# Patient Record
Sex: FEMALE | Race: WHITE | Employment: OTHER | URBAN - METROPOLITAN AREA
[De-identification: names, ages, dates, MRNs, and addresses within clinical notes are randomized per-mention and may not be internally consistent; named-entity substitution may affect disease eponyms.]

---

## 2017-06-14 DIAGNOSIS — R73.9 HYPERGLYCEMIA: ICD-10-CM

## 2017-06-14 DIAGNOSIS — E78.00 HYPERCHOLESTEROLEMIA: ICD-10-CM

## 2017-06-14 DIAGNOSIS — I10 ESSENTIAL HYPERTENSION: ICD-10-CM

## 2017-06-14 LAB
A/G RATIO: 1.7 (ref 1.1–2.2)
ALBUMIN SERPL-MCNC: 4.1 G/DL (ref 3.4–5)
ALP BLD-CCNC: 79 U/L (ref 40–129)
ALT SERPL-CCNC: 14 U/L (ref 10–40)
ANION GAP SERPL CALCULATED.3IONS-SCNC: 16 MMOL/L (ref 3–16)
AST SERPL-CCNC: 13 U/L (ref 15–37)
BILIRUB SERPL-MCNC: 0.4 MG/DL (ref 0–1)
BUN BLDV-MCNC: 16 MG/DL (ref 7–20)
CALCIUM SERPL-MCNC: 9.1 MG/DL (ref 8.3–10.6)
CHLORIDE BLD-SCNC: 104 MMOL/L (ref 99–110)
CHOLESTEROL, TOTAL: 170 MG/DL (ref 0–199)
CO2: 24 MMOL/L (ref 21–32)
CREAT SERPL-MCNC: 0.8 MG/DL (ref 0.6–1.2)
GFR AFRICAN AMERICAN: >60
GFR NON-AFRICAN AMERICAN: >60
GLOBULIN: 2.4 G/DL
GLUCOSE BLD-MCNC: 120 MG/DL (ref 70–99)
HDLC SERPL-MCNC: 42 MG/DL (ref 40–60)
LDL CHOLESTEROL CALCULATED: 108 MG/DL
POTASSIUM SERPL-SCNC: 4.6 MMOL/L (ref 3.5–5.1)
SODIUM BLD-SCNC: 144 MMOL/L (ref 136–145)
TOTAL PROTEIN: 6.5 G/DL (ref 6.4–8.2)
TRIGL SERPL-MCNC: 99 MG/DL (ref 0–150)
VLDLC SERPL CALC-MCNC: 20 MG/DL

## 2017-06-15 ENCOUNTER — OFFICE VISIT (OUTPATIENT)
Dept: INTERNAL MEDICINE CLINIC | Age: 77
End: 2017-06-15

## 2017-06-15 VITALS
TEMPERATURE: 98.2 F | DIASTOLIC BLOOD PRESSURE: 76 MMHG | WEIGHT: 161 LBS | SYSTOLIC BLOOD PRESSURE: 128 MMHG | HEIGHT: 64 IN | OXYGEN SATURATION: 97 % | HEART RATE: 62 BPM | BODY MASS INDEX: 27.49 KG/M2

## 2017-06-15 DIAGNOSIS — I10 ESSENTIAL HYPERTENSION: Primary | ICD-10-CM

## 2017-06-15 DIAGNOSIS — Z72.0 TOBACCO ABUSE: ICD-10-CM

## 2017-06-15 DIAGNOSIS — E78.00 HYPERCHOLESTEROLEMIA: ICD-10-CM

## 2017-06-15 DIAGNOSIS — R55 VASOVAGAL SYNCOPE: ICD-10-CM

## 2017-06-15 DIAGNOSIS — R73.9 HYPERGLYCEMIA: ICD-10-CM

## 2017-06-15 LAB
ESTIMATED AVERAGE GLUCOSE: 131.2 MG/DL
HBA1C MFR BLD: 6.2 %

## 2017-06-15 PROCEDURE — 99214 OFFICE O/P EST MOD 30 MIN: CPT | Performed by: INTERNAL MEDICINE

## 2017-06-15 ASSESSMENT — ENCOUNTER SYMPTOMS
COUGH: 1
EYES NEGATIVE: 1

## 2017-08-02 RX ORDER — ATENOLOL 50 MG/1
TABLET ORAL
Qty: 30 TABLET | Refills: 0 | Status: SHIPPED | OUTPATIENT
Start: 2017-08-02 | End: 2017-08-08

## 2017-08-02 RX ORDER — ATORVASTATIN CALCIUM 80 MG/1
TABLET, FILM COATED ORAL
Qty: 30 TABLET | Refills: 0 | Status: SHIPPED | OUTPATIENT
Start: 2017-08-02 | End: 2017-09-07 | Stop reason: SDUPTHER

## 2017-08-08 ENCOUNTER — TELEPHONE (OUTPATIENT)
Dept: INTERNAL MEDICINE CLINIC | Age: 77
End: 2017-08-08

## 2017-08-08 DIAGNOSIS — I10 ESSENTIAL HYPERTENSION: Primary | ICD-10-CM

## 2017-08-08 RX ORDER — CARVEDILOL 6.25 MG/1
6.25 TABLET ORAL 2 TIMES DAILY
Qty: 60 TABLET | Refills: 2 | Status: SHIPPED | OUTPATIENT
Start: 2017-08-08 | End: 2017-12-15 | Stop reason: SDUPTHER

## 2017-09-07 ENCOUNTER — TELEPHONE (OUTPATIENT)
Dept: INTERNAL MEDICINE CLINIC | Age: 77
End: 2017-09-07

## 2017-09-07 DIAGNOSIS — I10 ESSENTIAL HYPERTENSION: ICD-10-CM

## 2017-09-07 RX ORDER — CARVEDILOL 6.25 MG/1
6.25 TABLET ORAL 2 TIMES DAILY WITH MEALS
Qty: 60 TABLET | Refills: 5 | Status: SHIPPED | OUTPATIENT
Start: 2017-09-07 | End: 2017-12-15 | Stop reason: CLARIF

## 2017-09-07 RX ORDER — CARVEDILOL 6.25 MG/1
6.25 TABLET ORAL 2 TIMES DAILY WITH MEALS
COMMUNITY
End: 2017-09-07 | Stop reason: SDUPTHER

## 2017-12-13 DIAGNOSIS — R73.9 HYPERGLYCEMIA: ICD-10-CM

## 2017-12-13 DIAGNOSIS — E78.00 HYPERCHOLESTEROLEMIA: ICD-10-CM

## 2017-12-13 DIAGNOSIS — I10 ESSENTIAL HYPERTENSION: ICD-10-CM

## 2017-12-13 LAB
A/G RATIO: 1.7 (ref 1.1–2.2)
ALBUMIN SERPL-MCNC: 4.3 G/DL (ref 3.4–5)
ALP BLD-CCNC: 81 U/L (ref 40–129)
ALT SERPL-CCNC: 16 U/L (ref 10–40)
ANION GAP SERPL CALCULATED.3IONS-SCNC: 15 MMOL/L (ref 3–16)
AST SERPL-CCNC: 15 U/L (ref 15–37)
BILIRUB SERPL-MCNC: 0.6 MG/DL (ref 0–1)
BUN BLDV-MCNC: 15 MG/DL (ref 7–20)
CALCIUM SERPL-MCNC: 9.6 MG/DL (ref 8.3–10.6)
CHLORIDE BLD-SCNC: 102 MMOL/L (ref 99–110)
CHOLESTEROL, TOTAL: 202 MG/DL (ref 0–199)
CO2: 26 MMOL/L (ref 21–32)
CREAT SERPL-MCNC: 0.7 MG/DL (ref 0.6–1.2)
GFR AFRICAN AMERICAN: >60
GFR NON-AFRICAN AMERICAN: >60
GLOBULIN: 2.6 G/DL
GLUCOSE BLD-MCNC: 107 MG/DL (ref 70–99)
POTASSIUM SERPL-SCNC: 4.8 MMOL/L (ref 3.5–5.1)
SODIUM BLD-SCNC: 143 MMOL/L (ref 136–145)
TOTAL PROTEIN: 6.9 G/DL (ref 6.4–8.2)

## 2017-12-14 ENCOUNTER — TELEPHONE (OUTPATIENT)
Dept: INTERNAL MEDICINE CLINIC | Age: 77
End: 2017-12-14

## 2017-12-14 LAB
ESTIMATED AVERAGE GLUCOSE: 122.6 MG/DL
HBA1C MFR BLD: 5.9 %

## 2017-12-15 ENCOUNTER — OFFICE VISIT (OUTPATIENT)
Dept: INTERNAL MEDICINE CLINIC | Age: 77
End: 2017-12-15

## 2017-12-15 VITALS
WEIGHT: 160 LBS | HEART RATE: 70 BPM | SYSTOLIC BLOOD PRESSURE: 160 MMHG | OXYGEN SATURATION: 98 % | BODY MASS INDEX: 27.31 KG/M2 | HEIGHT: 64 IN | DIASTOLIC BLOOD PRESSURE: 86 MMHG

## 2017-12-15 DIAGNOSIS — I10 ESSENTIAL HYPERTENSION: Primary | ICD-10-CM

## 2017-12-15 DIAGNOSIS — Z72.0 TOBACCO ABUSE: ICD-10-CM

## 2017-12-15 DIAGNOSIS — E78.00 HYPERCHOLESTEROLEMIA: ICD-10-CM

## 2017-12-15 DIAGNOSIS — M25.552 PAIN OF LEFT HIP JOINT: ICD-10-CM

## 2017-12-15 DIAGNOSIS — R73.9 HYPERGLYCEMIA: ICD-10-CM

## 2017-12-15 PROCEDURE — 99214 OFFICE O/P EST MOD 30 MIN: CPT | Performed by: INTERNAL MEDICINE

## 2017-12-15 RX ORDER — ATENOLOL 50 MG/1
50 TABLET ORAL DAILY
Qty: 30 TABLET | Refills: 5 | Status: SHIPPED | OUTPATIENT
Start: 2017-12-15 | End: 2018-05-13 | Stop reason: SDUPTHER

## 2017-12-15 RX ORDER — ATENOLOL 50 MG/1
50 TABLET ORAL DAILY
COMMUNITY
End: 2017-12-15 | Stop reason: SDUPTHER

## 2017-12-15 ASSESSMENT — ENCOUNTER SYMPTOMS: RESPIRATORY NEGATIVE: 1

## 2018-01-02 ENCOUNTER — OFFICE VISIT (OUTPATIENT)
Dept: ORTHOPEDIC SURGERY | Age: 78
End: 2018-01-02

## 2018-01-02 VITALS
HEIGHT: 64 IN | TEMPERATURE: 98.7 F | WEIGHT: 160 LBS | HEART RATE: 57 BPM | RESPIRATION RATE: 15 BRPM | BODY MASS INDEX: 27.31 KG/M2 | SYSTOLIC BLOOD PRESSURE: 149 MMHG | DIASTOLIC BLOOD PRESSURE: 65 MMHG

## 2018-01-02 DIAGNOSIS — M16.12 PRIMARY OSTEOARTHRITIS OF LEFT HIP: Primary | ICD-10-CM

## 2018-01-02 PROCEDURE — 99203 OFFICE O/P NEW LOW 30 MIN: CPT | Performed by: ORTHOPAEDIC SURGERY

## 2018-01-02 NOTE — PATIENT INSTRUCTIONS
Impression:   1. Moderate to severe osteoarthritis of her left hip joint rated to overuse. 2. History of polio since 5years of age affecting right lower extremity. 3. Aspirin allergy. 4. Smoking history. Plan/Treatment:   1. Treatment options were discussed with José Antonio Matthews. Since she is limited on medications I have recommended an ultrasound directed interarticular steroid injection. 2. She is scheduled to return tomorrow to our [place] office for this reason.     Axel Hendrickson MD  1/2/2018

## 2018-01-03 ENCOUNTER — OFFICE VISIT (OUTPATIENT)
Dept: ORTHOPEDIC SURGERY | Age: 78
End: 2018-01-03

## 2018-01-03 VITALS
HEIGHT: 64 IN | WEIGHT: 160 LBS | HEART RATE: 56 BPM | SYSTOLIC BLOOD PRESSURE: 147 MMHG | BODY MASS INDEX: 27.31 KG/M2 | DIASTOLIC BLOOD PRESSURE: 70 MMHG

## 2018-01-03 DIAGNOSIS — M16.12 PRIMARY OSTEOARTHRITIS OF LEFT HIP: Primary | ICD-10-CM

## 2018-01-03 PROCEDURE — 99999 PR OFFICE/OUTPT VISIT,PROCEDURE ONLY: CPT | Performed by: ORTHOPAEDIC SURGERY

## 2018-01-03 PROCEDURE — 20611 DRAIN/INJ JOINT/BURSA W/US: CPT | Performed by: ORTHOPAEDIC SURGERY

## 2018-05-07 RX ORDER — ATORVASTATIN CALCIUM 80 MG/1
TABLET, FILM COATED ORAL
Qty: 90 TABLET | Refills: 2 | Status: SHIPPED | OUTPATIENT
Start: 2018-05-07 | End: 2019-02-02 | Stop reason: SDUPTHER

## 2018-05-13 DIAGNOSIS — I10 ESSENTIAL HYPERTENSION: ICD-10-CM

## 2018-05-13 RX ORDER — ATENOLOL 50 MG/1
50 TABLET ORAL DAILY
Qty: 90 TABLET | Refills: 1 | Status: SHIPPED | OUTPATIENT
Start: 2018-05-13 | End: 2018-11-05 | Stop reason: SDUPTHER

## 2018-06-08 DIAGNOSIS — R73.9 HYPERGLYCEMIA: ICD-10-CM

## 2018-06-08 DIAGNOSIS — E78.00 HYPERCHOLESTEROLEMIA: ICD-10-CM

## 2018-06-08 DIAGNOSIS — I10 ESSENTIAL HYPERTENSION: ICD-10-CM

## 2018-06-08 LAB
A/G RATIO: 1.7 (ref 1.1–2.2)
ALBUMIN SERPL-MCNC: 4.3 G/DL (ref 3.4–5)
ALP BLD-CCNC: 84 U/L (ref 40–129)
ALT SERPL-CCNC: 15 U/L (ref 10–40)
ANION GAP SERPL CALCULATED.3IONS-SCNC: 15 MMOL/L (ref 3–16)
AST SERPL-CCNC: 14 U/L (ref 15–37)
BILIRUB SERPL-MCNC: 0.6 MG/DL (ref 0–1)
BUN BLDV-MCNC: 19 MG/DL (ref 7–20)
CALCIUM SERPL-MCNC: 9.3 MG/DL (ref 8.3–10.6)
CHLORIDE BLD-SCNC: 104 MMOL/L (ref 99–110)
CHOLESTEROL, TOTAL: 195 MG/DL (ref 0–199)
CO2: 25 MMOL/L (ref 21–32)
CREAT SERPL-MCNC: 0.8 MG/DL (ref 0.6–1.2)
GFR AFRICAN AMERICAN: >60
GFR NON-AFRICAN AMERICAN: >60
GLOBULIN: 2.6 G/DL
GLUCOSE BLD-MCNC: 117 MG/DL (ref 70–99)
HDLC SERPL-MCNC: 48 MG/DL (ref 40–60)
LDL CHOLESTEROL CALCULATED: 128 MG/DL
POTASSIUM SERPL-SCNC: 4.7 MMOL/L (ref 3.5–5.1)
SODIUM BLD-SCNC: 144 MMOL/L (ref 136–145)
TOTAL PROTEIN: 6.9 G/DL (ref 6.4–8.2)
TRIGL SERPL-MCNC: 95 MG/DL (ref 0–150)
VLDLC SERPL CALC-MCNC: 19 MG/DL

## 2018-06-09 LAB
ESTIMATED AVERAGE GLUCOSE: 122.6 MG/DL
HBA1C MFR BLD: 5.9 %

## 2018-06-12 ENCOUNTER — OFFICE VISIT (OUTPATIENT)
Dept: INTERNAL MEDICINE CLINIC | Age: 78
End: 2018-06-12

## 2018-06-12 VITALS
BODY MASS INDEX: 27.42 KG/M2 | RESPIRATION RATE: 12 BRPM | DIASTOLIC BLOOD PRESSURE: 72 MMHG | OXYGEN SATURATION: 98 % | HEART RATE: 81 BPM | WEIGHT: 160.6 LBS | HEIGHT: 64 IN | SYSTOLIC BLOOD PRESSURE: 132 MMHG

## 2018-06-12 DIAGNOSIS — I10 ESSENTIAL HYPERTENSION: Primary | ICD-10-CM

## 2018-06-12 DIAGNOSIS — M16.12 PRIMARY OSTEOARTHRITIS OF LEFT HIP: ICD-10-CM

## 2018-06-12 DIAGNOSIS — E78.00 HYPERCHOLESTEROLEMIA: ICD-10-CM

## 2018-06-12 DIAGNOSIS — Z72.0 TOBACCO ABUSE: ICD-10-CM

## 2018-06-12 DIAGNOSIS — R73.9 HYPERGLYCEMIA: ICD-10-CM

## 2018-06-12 PROCEDURE — 99214 OFFICE O/P EST MOD 30 MIN: CPT | Performed by: INTERNAL MEDICINE

## 2018-06-12 ASSESSMENT — ENCOUNTER SYMPTOMS
RESPIRATORY NEGATIVE: 1
EYE REDNESS: 0
CHEST TIGHTNESS: 0
EYES NEGATIVE: 1
BACK PAIN: 1
ABDOMINAL DISTENTION: 0
SINUS PAIN: 0
ABDOMINAL PAIN: 0
SHORTNESS OF BREATH: 0
COUGH: 0
WHEEZING: 0
DIARRHEA: 0
VOMITING: 0
SORE THROAT: 0
GASTROINTESTINAL NEGATIVE: 1

## 2018-11-05 DIAGNOSIS — I10 ESSENTIAL HYPERTENSION: ICD-10-CM

## 2018-11-05 RX ORDER — ATENOLOL 50 MG/1
50 TABLET ORAL DAILY
Qty: 90 TABLET | Refills: 1 | Status: SHIPPED | OUTPATIENT
Start: 2018-11-05 | End: 2019-05-08 | Stop reason: SDUPTHER

## 2018-11-21 DIAGNOSIS — I10 ESSENTIAL HYPERTENSION: ICD-10-CM

## 2018-11-21 RX ORDER — LOSARTAN POTASSIUM 50 MG/1
TABLET ORAL
Qty: 90 TABLET | Refills: 0 | Status: SHIPPED | OUTPATIENT
Start: 2018-11-21 | End: 2019-02-20 | Stop reason: SDUPTHER

## 2018-12-10 DIAGNOSIS — E78.00 HYPERCHOLESTEROLEMIA: ICD-10-CM

## 2018-12-10 DIAGNOSIS — R73.9 HYPERGLYCEMIA: ICD-10-CM

## 2018-12-10 DIAGNOSIS — I10 ESSENTIAL HYPERTENSION: ICD-10-CM

## 2018-12-10 LAB
A/G RATIO: 1.3 (ref 1.1–2.2)
ALBUMIN SERPL-MCNC: 3.9 G/DL (ref 3.4–5)
ALP BLD-CCNC: 76 U/L (ref 40–129)
ALT SERPL-CCNC: 18 U/L (ref 10–40)
ANION GAP SERPL CALCULATED.3IONS-SCNC: 16 MMOL/L (ref 3–16)
AST SERPL-CCNC: 16 U/L (ref 15–37)
BILIRUB SERPL-MCNC: 0.4 MG/DL (ref 0–1)
BUN BLDV-MCNC: 11 MG/DL (ref 7–20)
CALCIUM SERPL-MCNC: 9.2 MG/DL (ref 8.3–10.6)
CHLORIDE BLD-SCNC: 105 MMOL/L (ref 99–110)
CHOLESTEROL, TOTAL: 196 MG/DL (ref 0–199)
CO2: 22 MMOL/L (ref 21–32)
CREAT SERPL-MCNC: 0.7 MG/DL (ref 0.6–1.2)
GFR AFRICAN AMERICAN: >60
GFR NON-AFRICAN AMERICAN: >60
GLOBULIN: 2.9 G/DL
GLUCOSE BLD-MCNC: 113 MG/DL (ref 70–99)
POTASSIUM SERPL-SCNC: 4.3 MMOL/L (ref 3.5–5.1)
SODIUM BLD-SCNC: 143 MMOL/L (ref 136–145)
TOTAL PROTEIN: 6.8 G/DL (ref 6.4–8.2)

## 2018-12-13 LAB
REASON FOR REJECTION: NORMAL
REJECTED TEST: NORMAL

## 2019-02-02 RX ORDER — ATORVASTATIN CALCIUM 80 MG/1
TABLET, FILM COATED ORAL
Qty: 90 TABLET | Refills: 0 | Status: SHIPPED | OUTPATIENT
Start: 2019-02-02 | End: 2019-05-01 | Stop reason: SDUPTHER

## 2019-02-20 DIAGNOSIS — I10 ESSENTIAL HYPERTENSION: ICD-10-CM

## 2019-02-20 RX ORDER — LOSARTAN POTASSIUM 50 MG/1
TABLET ORAL
Qty: 90 TABLET | Refills: 1 | Status: SHIPPED | OUTPATIENT
Start: 2019-02-20 | End: 2019-08-24 | Stop reason: SDUPTHER

## 2019-05-01 RX ORDER — ATORVASTATIN CALCIUM 80 MG/1
TABLET, FILM COATED ORAL
Qty: 90 TABLET | Refills: 0 | Status: SHIPPED | OUTPATIENT
Start: 2019-05-01 | End: 2019-08-02 | Stop reason: SDUPTHER

## 2019-05-08 DIAGNOSIS — I10 ESSENTIAL HYPERTENSION: ICD-10-CM

## 2019-05-08 RX ORDER — ATENOLOL 50 MG/1
50 TABLET ORAL DAILY
Qty: 90 TABLET | Refills: 0 | Status: SHIPPED | OUTPATIENT
Start: 2019-05-08 | End: 2019-08-02 | Stop reason: SDUPTHER

## 2019-08-24 DIAGNOSIS — I10 ESSENTIAL HYPERTENSION: ICD-10-CM

## 2019-08-24 RX ORDER — LOSARTAN POTASSIUM 50 MG/1
TABLET ORAL
Qty: 90 TABLET | Refills: 2 | Status: SHIPPED | OUTPATIENT
Start: 2019-08-24 | End: 2020-05-18

## 2020-01-25 RX ORDER — ATORVASTATIN CALCIUM 80 MG/1
TABLET, FILM COATED ORAL
Qty: 90 TABLET | Refills: 1 | Status: SHIPPED | OUTPATIENT
Start: 2020-01-25 | End: 2020-07-24

## 2020-01-27 RX ORDER — ATENOLOL 50 MG/1
50 TABLET ORAL DAILY
Qty: 90 TABLET | Refills: 1 | Status: SHIPPED | OUTPATIENT
Start: 2020-01-27 | End: 2020-07-31

## 2020-05-18 RX ORDER — LOSARTAN POTASSIUM 50 MG/1
TABLET ORAL
Qty: 90 TABLET | Refills: 2 | Status: SHIPPED | OUTPATIENT
Start: 2020-05-18 | End: 2021-02-12

## 2020-06-15 DIAGNOSIS — E78.00 HYPERCHOLESTEROLEMIA: ICD-10-CM

## 2020-06-15 DIAGNOSIS — R73.9 HYPERGLYCEMIA: ICD-10-CM

## 2020-06-15 DIAGNOSIS — I10 ESSENTIAL HYPERTENSION: ICD-10-CM

## 2020-06-15 LAB
A/G RATIO: 1.7 (ref 1.1–2.2)
ALBUMIN SERPL-MCNC: 4.3 G/DL (ref 3.4–5)
ALP BLD-CCNC: 87 U/L (ref 40–129)
ALT SERPL-CCNC: 15 U/L (ref 10–40)
ANION GAP SERPL CALCULATED.3IONS-SCNC: 12 MMOL/L (ref 3–16)
AST SERPL-CCNC: 14 U/L (ref 15–37)
BILIRUB SERPL-MCNC: 0.4 MG/DL (ref 0–1)
BUN BLDV-MCNC: 11 MG/DL (ref 7–20)
CALCIUM SERPL-MCNC: 9.6 MG/DL (ref 8.3–10.6)
CHLORIDE BLD-SCNC: 105 MMOL/L (ref 99–110)
CO2: 26 MMOL/L (ref 21–32)
CREAT SERPL-MCNC: 0.7 MG/DL (ref 0.6–1.2)
GFR AFRICAN AMERICAN: >60
GFR NON-AFRICAN AMERICAN: >60
GLOBULIN: 2.5 G/DL
GLUCOSE BLD-MCNC: 119 MG/DL (ref 70–99)
POTASSIUM SERPL-SCNC: 5 MMOL/L (ref 3.5–5.1)
SODIUM BLD-SCNC: 143 MMOL/L (ref 136–145)
TOTAL PROTEIN: 6.8 G/DL (ref 6.4–8.2)

## 2020-06-16 LAB
ESTIMATED AVERAGE GLUCOSE: 134.1 MG/DL
HBA1C MFR BLD: 6.3 %

## 2020-06-17 ENCOUNTER — HOSPITAL ENCOUNTER (OUTPATIENT)
Dept: VASCULAR LAB | Age: 80
Discharge: HOME | End: 2020-06-17
Payer: MEDICARE

## 2020-06-17 PROCEDURE — 93971 EXTREMITY STUDY: CPT

## 2020-07-07 ENCOUNTER — INITIAL CONSULT (OUTPATIENT)
Dept: SURGERY | Age: 80
End: 2020-07-07
Payer: MEDICARE

## 2020-07-07 VITALS — DIASTOLIC BLOOD PRESSURE: 73 MMHG | HEART RATE: 60 BPM | SYSTOLIC BLOOD PRESSURE: 155 MMHG

## 2020-07-07 PROCEDURE — 99204 OFFICE O/P NEW MOD 45 MIN: CPT | Performed by: SURGERY

## 2020-07-07 NOTE — PROGRESS NOTES
Daily Progress Note   Vikram Miranda MD      7/7/2020    Chief Complaint   Patient presents with    New Patient     New patient consult for right leg swelling. HISTORY OF PRESENT ILLNESS:                The patient is a 70 y.o. female who presents with right leg swelling. Ms. Enoch Drake says she had polio when she was 4. Her right leg is somewhat differently shaped than the left. She has to buy two different sized shoes as well. Ms. Enoch Drake says she fell off a boat dock about four years ago, injuring the skin on her shin, tearing it open nearly to the bone. She says Dr. Oren Live took care of the wound for her. She does smoke about half a pack of cigarettes a day.      Past Medical History:   Diagnosis Date    Hyperglycemia     Hyperlipidemia     Hypertension     Osteoarthritis     Poliomyelitis osteopathy of lower leg (HCC)     right     Renal calculus or stone right     Restless leg     Vertigo        Past Surgical History:   Procedure Laterality Date    APPENDECTOMY         Social History     Socioeconomic History    Marital status:      Spouse name: Not on file    Number of children: Not on file    Years of education: Not on file    Highest education level: Not on file   Occupational History    Occupation: retired   Social Needs    Financial resource strain: Not hard at all   [employer] insecurity     Worry: Never true     Inability: Never true   [place] needs     Medical: No     Non-medical: No   Tobacco Use    Smoking status: Current Every Day Smoker     Packs/day: 0.50    Smokeless tobacco: Never Used    Tobacco comment: discussed  quitting   Substance and Sexual Activity    Alcohol use: No    Drug use: No     Frequency: 2.0 times per week     Comment: 1-2 pks per day    Sexual activity: Not Currently   Lifestyle    Physical activity     Days per week: Not on file     Minutes per session: Not on file    Stress: Not on file   Relationships    Social connections sounds are normal.   Musculoskeletal: Normal range of motion. Legs:         Feet:    Neurological:      Mental Status: She is alert and oriented to person, place, and time. Deep Tendon Reflexes: Reflexes are normal and symmetric. Psychiatric:         Speech: Speech normal.         Behavior: Behavior normal.         Thought Content: Thought content normal.         Judgment: Judgment normal.       Ms. Darryl Masters had polio at age 5. 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 She had a large surgery on her right ankle when she was a child that helped her to walk. Ms. Darryl Masters had her appendix out laparoscopically. ASSESSMENT:    Problem List Items Addressed This Visit     Tobacco abuse    Edema      Other Visit Diagnoses     Leg swelling    -  Primary          PLAN:    Ms. Darryl Masters will be given a prescription for compression hose to wear. Once she has the stockings, come back for a leg and stocking check. Tyler Larsen MA am scribing for and in the presence of Charisse Yi MD on this date of 07/07/20    I Breck Spurling, MD personally performed the services described in this documentation as scribed by the Medical Assistant Licha Rice in my presence and it is both accurate and complete.         Electronically signed by Charisse Yi MD on 7/7/2020 at 5:01 PM

## 2020-07-24 RX ORDER — ATORVASTATIN CALCIUM 80 MG/1
TABLET, FILM COATED ORAL
Qty: 90 TABLET | Refills: 1 | Status: SHIPPED | OUTPATIENT
Start: 2020-07-24 | End: 2021-01-19

## 2020-07-31 RX ORDER — ATENOLOL 50 MG/1
50 TABLET ORAL DAILY
Qty: 90 TABLET | Refills: 1 | Status: SHIPPED | OUTPATIENT
Start: 2020-07-31 | End: 2021-01-28

## 2021-01-19 RX ORDER — ATORVASTATIN CALCIUM 80 MG/1
TABLET, FILM COATED ORAL
Qty: 90 TABLET | Refills: 1 | Status: SHIPPED | OUTPATIENT
Start: 2021-01-19 | End: 2021-07-26

## 2021-02-12 DIAGNOSIS — I10 ESSENTIAL HYPERTENSION: ICD-10-CM

## 2021-02-12 DIAGNOSIS — R73.9 HYPERGLYCEMIA: ICD-10-CM

## 2021-02-12 LAB
ANION GAP SERPL CALCULATED.3IONS-SCNC: 13 MMOL/L (ref 3–16)
BUN BLDV-MCNC: 10 MG/DL (ref 7–20)
CALCIUM SERPL-MCNC: 9.6 MG/DL (ref 8.3–10.6)
CHLORIDE BLD-SCNC: 105 MMOL/L (ref 99–110)
CO2: 25 MMOL/L (ref 21–32)
CREAT SERPL-MCNC: 0.7 MG/DL (ref 0.6–1.2)
GFR AFRICAN AMERICAN: >60
GFR NON-AFRICAN AMERICAN: >60
GLUCOSE BLD-MCNC: 125 MG/DL (ref 70–99)
POTASSIUM SERPL-SCNC: 4.3 MMOL/L (ref 3.5–5.1)
SODIUM BLD-SCNC: 143 MMOL/L (ref 136–145)

## 2021-02-12 RX ORDER — LOSARTAN POTASSIUM 50 MG/1
TABLET ORAL
Qty: 90 TABLET | Refills: 2 | Status: SHIPPED | OUTPATIENT
Start: 2021-02-12 | End: 2021-03-12 | Stop reason: CLARIF

## 2021-02-13 LAB
ESTIMATED AVERAGE GLUCOSE: 134.1 MG/DL
HBA1C MFR BLD: 6.3 %

## 2021-03-05 ENCOUNTER — OFFICE VISIT (OUTPATIENT)
Dept: ORTHOPEDIC SURGERY | Age: 81
End: 2021-03-05
Payer: MEDICARE

## 2021-03-05 ENCOUNTER — TELEPHONE (OUTPATIENT)
Dept: ORTHOPEDIC SURGERY | Age: 81
End: 2021-03-05

## 2021-03-05 VITALS
SYSTOLIC BLOOD PRESSURE: 136 MMHG | HEART RATE: 75 BPM | BODY MASS INDEX: 29.02 KG/M2 | WEIGHT: 170 LBS | HEIGHT: 64 IN | TEMPERATURE: 97.1 F | DIASTOLIC BLOOD PRESSURE: 80 MMHG

## 2021-03-05 DIAGNOSIS — M25.512 ACUTE PAIN OF LEFT SHOULDER: ICD-10-CM

## 2021-03-05 DIAGNOSIS — S42.152A CLOSED FRACTURE OF GLENOID CAVITY AND NECK OF LEFT SCAPULA, INITIAL ENCOUNTER: Primary | ICD-10-CM

## 2021-03-05 DIAGNOSIS — S42.142A CLOSED FRACTURE OF GLENOID CAVITY AND NECK OF LEFT SCAPULA, INITIAL ENCOUNTER: Primary | ICD-10-CM

## 2021-03-05 PROCEDURE — 99203 OFFICE O/P NEW LOW 30 MIN: CPT | Performed by: ORTHOPAEDIC SURGERY

## 2021-03-05 NOTE — PATIENT INSTRUCTIONS
Impression:   1. Minimally displaced fracture of anterior glenoid rim based upon plain x-rays. 2.  No apparent dislocation based on history. Plan/Treatment:   1. A CT scan of the left shoulder will be performed to better delineate the glenoid fracture. 2.  She is allowed to continue her normal activities of daily living but no heavy lifting. 3.  Return to the office in a week for review of the CT scan results. 4.  If there is any concern about rotator cuff in a diagnostic ultrasound will be performed in office. 5.  As long as the fracture is not larger than anticipated physical therapy will likely be started next week.       Desiree Moreno MD  3/5/2021

## 2021-03-05 NOTE — TELEPHONE ENCOUNTER
I called and left message to call [facility] and schedule the CY scan. I gave her the number.  I also told her to call back and make an appt to see Dr Bonifacio Christina

## 2021-03-05 NOTE — PROGRESS NOTES
Initial Shoulder Evaluation                                                             3/5/2021    Ryan Tavarez     1940       History of Present Illness:    Linsey Solomon is seen for initial evaluation for Shoulder Pain (Left. She fell 3 weeks ago while cleaning ice on her drive way)      Linsey Solomon is a 68-year-old woman sent by Dr. Miguelito Pickens for evaluation of injury to her left shoulder which occurred 3 weeks ago when she was cleaning ice from her driveway. She states the shoulder got stuck causing her to fall over landing directly on her left shoulder. She was able to get herself up and dust the snow off but did not go back to shoveling. She states she had initially some swelling but no ecchymosis. She has been able to do her activities of daily living around the house including reaching up to do her hair. She has been taking Tylenol for her pain. She complains of persistent pain with no pain at rest but pain up to 5 in the anterior shoulder region. There is no associated numbness or weakness of the left upper extremity. She denies any previous similar injury. She denies any dislocation episodes including during this injury. I have today reviewed with Ryan Tavarez the clinically relevant past medical history, medications, allergies, family history, and Review of Systems from the patients most recent history form, and I have documented any details relevant to today's presenting complaints in my history above. The patient's self recorded documents concerning the above have been scanned  into the chart under the \"Media\" tab.     /80   Pulse 75   Temp 97.1 °F (36.2 °C)   Ht 5' 4\" (1.626 m)   Wt 170 lb (77.1 kg)   LMP 12/28/1985 (Approximate)   BMI 29.18 kg/m²     PHYSICAL EXAMINATION    General Appearance: no acute distress, alert, oriented x 3    Atrophy: No  Ecchymosis: No   Errythemia: No  Swelling: No   Deformity: No  Scapular Winging: No  Palpation/Tenderness: no  Crepitus: neg Neurovascular Status: intact    Pulses (0-4)   Radial    Ulnar   Right     Left 2+      Range of Motion: Degrees   Abduction External Internal Passive Abd   Right 150 90 70    Left 112 40 20       SpecialTest:   Impingement Rella Piano Crossover Sign Speed Yurgensons Subacromial  Injection test SLAP T    DLST   Right          Left            Strength Rotator Cuff:           Normal=N    Weak=W     Antalgic=A    Supraspinatus  Subscapularis  Infraspinatus  Teres Minor    Right Normal  Normal Normal      Left Normal  Mild A Normal         X-Ray Findings taken in Office: 4 views of the left shoulder were read by myself and shows a small avulsion of the anterior aspect of the glenoid rim. There appears to be only 1 to 2 mm of step-off at joint level. No evidence of fracture of the proximal humerus or clavicle. Impression:   1. Minimally displaced fracture of anterior glenoid rim based upon plain x-rays. 2.  No apparent dislocation based on history. Plan/Treatment:   1. A CT scan of the left shoulder will be performed to better delineate the glenoid fracture. 2.  She is allowed to continue her normal activities of daily living but no heavy lifting. 3.  Return to the office in a week for review of the CT scan results. 4.  If there is any concern about rotator cuff in a diagnostic ultrasound will be performed in office. 5.  As long as the fracture is not larger than anticipated physical therapy will likely be started next week. Viral Young MD  3/5/2021    This dictation was done with Dragon dictation and may contain mechanical errors related to translation.

## 2021-03-10 ENCOUNTER — TELEPHONE (OUTPATIENT)
Dept: ORTHOPEDIC SURGERY | Age: 81
End: 2021-03-10

## 2021-03-10 NOTE — TELEPHONE ENCOUNTER
General Question     Subject: Patient requesting to schedule ct scan  Patient : Ian Naida  Contact Number: [phone]

## 2021-03-10 NOTE — TELEPHONE ENCOUNTER
I spoke with patient. She did not get my message from last Friday. I called her and left a message letting her know her MRI was approved. So I gave her the number to call and I told her to call back to make an appt with Dr Alfred Hein. She understood.

## 2021-03-15 ENCOUNTER — HOSPITAL ENCOUNTER (OUTPATIENT)
Dept: CT IMAGING | Age: 81
Discharge: HOME | End: 2021-03-15
Payer: MEDICARE

## 2021-03-15 DIAGNOSIS — S42.152A CLOSED FRACTURE OF GLENOID CAVITY AND NECK OF LEFT SCAPULA, INITIAL ENCOUNTER: ICD-10-CM

## 2021-03-15 DIAGNOSIS — S42.142A CLOSED FRACTURE OF GLENOID CAVITY AND NECK OF LEFT SCAPULA, INITIAL ENCOUNTER: ICD-10-CM

## 2021-03-15 PROCEDURE — 73200 CT UPPER EXTREMITY W/O DYE: CPT

## 2021-03-19 ENCOUNTER — OFFICE VISIT (OUTPATIENT)
Dept: ORTHOPEDIC SURGERY | Age: 81
End: 2021-03-19
Payer: MEDICARE

## 2021-03-19 VITALS
DIASTOLIC BLOOD PRESSURE: 85 MMHG | HEIGHT: 64 IN | WEIGHT: 170 LBS | HEART RATE: 58 BPM | TEMPERATURE: 96.9 F | SYSTOLIC BLOOD PRESSURE: 180 MMHG | BODY MASS INDEX: 29.02 KG/M2

## 2021-03-19 DIAGNOSIS — S42.152A CLOSED FRACTURE OF GLENOID CAVITY AND NECK OF LEFT SCAPULA, INITIAL ENCOUNTER: Primary | ICD-10-CM

## 2021-03-19 DIAGNOSIS — S42.142A CLOSED FRACTURE OF GLENOID CAVITY AND NECK OF LEFT SCAPULA, INITIAL ENCOUNTER: Primary | ICD-10-CM

## 2021-03-19 PROCEDURE — 99213 OFFICE O/P EST LOW 20 MIN: CPT | Performed by: ORTHOPAEDIC SURGERY

## 2021-03-19 NOTE — PROGRESS NOTES
Follow Up Shoulder Evaluation   3/19/2021    Sue Pat      1940        Sherine Sandoval is seen in follow up for Shoulder Pain (LT Shoulder  CT scan done on 3/15/21)     Sherine Sandoval returns now 5 weeks post fall on her driveway while shoveling snow in which she injured her left shoulder. She continues to have pain about the same in her left shoulder. In particular there is no pain at rest but pain up to 7 with activities including reaching overhead or behind her back. Pain is an intermittent ache primarily in her proximal humerus radiating to her brachium. She denies any previous problem with this left shoulder. She has no complaints of numbness. I have today reviewed with Sue Pat the clinically relevant past medical history, medications, allergies, family history, and Review of Systems from the patients most recent history form, and I have documented any details relevant to today's presenting complaints in my history above. The patient's self recorded documents concerning the above have been scanned  into the chart under the \"Media\" tab.     BP (!) 180/85   Pulse 58   Temp 96.9 °F (36.1 °C)   Ht 5' 4\" (1.626 m)   Wt 170 lb (77.1 kg)   LMP 12/28/1985 (Approximate)   BMI 29.18 kg/m²     PHYSICAL EXAMINATION    General Appearance: no acute distress, alert, oriented x 3, appropriate mood and affect  Atrophy: No  Ecchymosis: No   Errythemia: No  Swelling: No   Deformity: No  Scapular Winging: No  Palpation/Tenderness: no    Range of Motion: Degrees   Abduction External Internal Passive Abd   Right 132 90 60    Left 102 50 30       SpecialTest:   Impingement Breanne Rucker Crossover Sign Speed Sanjuana Subacromial  inj SLAP T    DLST     Right    neg      Left    neg          Strength Rotator Cuff:           Normal=N    Weak=W     Antalgic=A    Supraspinatus  Subscapularis  Infraspinatus  Teres Minor    Right N N N     Left N Mild A N        CT Findings: Left Shoulder done on 3/15/21 was reviewed and show comminuted fracture involving the anterior aspect of the right glenoid. The anterior 20% does show some depression and and displacement system was which was seen on x-ray. Although the fracture does reach to the midpoint of her joint, 80% of the joint is stable without displacement based upon measurement. Impression:   1.  5 weeks post comminuted fracture of left glenoid with 80% of the joint in stable position. 2.  She has no history of dislocation either prior to the injury or associated with this injury. 3.  No sign of rotator cuff tear based upon today's exam.      Plan/Treatment:   1. Based upon the CT scan findings and lack of instability history or symptoms, I have recommended she be treated nonoperatively. 2.  She is started in physical therapy to regain motion of her left shoulder. 3.  Return to the office in 4 weeks for repeat examination. 4.  She was told that if any instability occurs operative mention would be indicated. 5.  If she has difficulty regaining her motion or strength a diagnostic ultrasound of the left shoulder would be indicated next office visit. Dakotah Smith MD  3/19/2021    This dictation was done with [employer] dictation and may contain mechanical errors related to translation.

## 2021-03-29 ENCOUNTER — HOSPITAL ENCOUNTER (OUTPATIENT)
Dept: PHYSICAL THERAPY | Age: 81
Setting detail: THERAPIES SERIES
Discharge: HOME | End: 2021-03-29
Payer: MEDICARE

## 2021-03-29 PROCEDURE — 97140 MANUAL THERAPY 1/> REGIONS: CPT

## 2021-03-29 PROCEDURE — 97110 THERAPEUTIC EXERCISES: CPT

## 2021-03-29 PROCEDURE — 97161 PT EVAL LOW COMPLEX 20 MIN: CPT

## 2021-03-29 NOTE — PLAN OF CARE
Antoinette  74.      Physical Therapy Certification    Dear Referring Practitioner: Priyank Summers,    We had the pleasure of evaluating the following patient for physical therapy services at [address] [place] [address]. A summary of our findings can be found in the initial assessment below. This includes our plan of care. If you have any questions or concerns regarding these findings, please do not hesitate to contact me at the office phone number checked above. Thank you for the referral.       Physician Signature:_______________________________Date:__________________  By signing above (or electronic signature), therapists plan is approved by physician      Patient: Michael Kendrick   : 1940   MRN: [id number]  Referring Physician: Referring Practitioner: Priyank Summers      Evaluation Date: 3/29/2021      Medical Diagnosis Information:  Diagnosis: S42.14 Fracture of glenoid cavity of scapula; M25.512 L shoulder pain   Treatment Diagnosis: S42.14 Fracture of glenoid cavity of scapula; M25.512 L shoulder pain                                         Insurance information: PT Insurance Information: [place] (Medicare) - [employer]    Precautions/ Contra-indications: High BP. 1 pack-a-day smoker. C-SSRS Triggered by Intake questionnaire (Past 2 wk assessment):   [x] No, Questionnaire did not trigger screening.   [] Yes, Patient intake triggered further evaluation      [] C-SSRS Screening completed  [] PCP notified via Plan of Care  [] Emergency services notified     Latex Allergy:  [x]NO      []YES  Preferred Language for Healthcare:   [x]English       []Other:    SUBJECTIVE: Patient stated complaint:  Patient was trying to help her  shovel snow during the large snow storm in mid-February. Patient's shovel hit some ice, which knocked her off balance causing her to fall. Patient does not remember exactly how she landed.  Patient to see her PCP about 2 weeks later who referred patient to Dr. Rosalia Shah. X-ray and CT scan imaging shows a closed fracture of the glenoid cavity and neck of the L scapula. Patient is now about 8 weeks from the fall. Most recent imaging taken on 3/15/21 indicates mildly displaced anterior inferior glenoid fracture. Patient notes an intermittent nagging achiness globally throughout the L shoulder. No radiating pain. RHD. Relevant Medical History:  High BP. 1 pack-a-day smoker. Functional Disability Index: 5% disability - [employer]; 6% disability - [employer] (75/80)       Pain Scale: 4/10 at start of session. 0/10 at best. 7/10 at worst.   Easing factors: Avoiding using her L arm/shoulder. Tylenol as needed. Hot rag or hot bath. Provocative factors:  Reaching up [facility] [place]. Reaching out to the side. Getting her arm in her sleeve to get dressed. Reaching behind her back to tuck in a shirt or wash her back. Pushing up out of bed with the L arm. Type: []Constant   [x]Intermittent  []Radiating [x]Localized []other:     Numbness/Tingling: Denies N/T. Occupation/School: Patient is retired. Patient had a sign painting business. Living Status/Prior Level of Function: Independent with ADLs and IADLs. Patient and her  have a [address] where they do a lot of boating and playing on the water. OBJECTIVE:     CERV ROM     Cervical Flexion WNL    Cervical Extension Restricted. Forward head posture; increased thoracic kyphosis. Cervical SB     Cervical rotation Mildly restricted at end range. Mildly restricted at end range. ROM Left Right   Shoulder Flex 101 p!/120 (passive) 143   Shoulder Abd 75 Very difficult, but no increase in pain. /90 (passive) 145   Shoulder ER Reaches behind head to C6. P!/45 (passive) Reaches behind head to T2. Shoulder IR Reaches behind back to L glut. P!/45 (passive) Reaches behind back to T10.                   Strength  Left Right   Shoulder Flex     Shoulder Abd     Shoulder ER     Shoulder IR Reflexes/Sensation:    [x]Dermatomes/Myotomes intact    [x]Reflexes equal and normal bilaterally   []Other:    Joint mobility: L GH posterior glide; inferior glide   []Normal    [x]Hypo   []Hyper    Palpation:     Functional Mobility/Transfers:   Bed mobility:  Tracy - requires increased time to perform. Avoids use of L UE. Posture: Anterior and internally rotated humerus bilaterally; scapulas abducted, mildly protracted, and downwardly rotated bilaterally; forward head posture in static sitting; decreased thoracic kyphosis. Bandages/Dressings/Incisions: NA    Gait: (include devices/WB status): WNL    Orthopedic Special Tests:    Normal Abnormal N/A Comments   Painful Arc [] [] []    Resisted ER [] [] []    Peter-John [] [] []    Champagne Toast test [] [] []    Drop arm test [] [] []    ER lag sign [] [] []    IR lift off [] [] []    Izzy Test [] [] []    Nancy Gutter [] [] []    Speed's [] [] []    Apprehension [] [] []                                  [x] Patient history, allergies, meds reviewed. Medical chart reviewed. See intake form. Review Of Systems (ROS):  [x]Performed Review of systems (Integumentary, CardioPulmonary, Neurological) by intake and observation. Intake form has been scanned into medical record. Patient has been instructed to contact their primary care physician regarding ROS issues if not already being addressed at this time.       Co-morbidities/Complexities (which will affect course of rehabilitation):   []None           Arthritic conditions   []Rheumatoid arthritis (M05.9)  []Osteoarthritis (M19.91)   Cardiovascular conditions   [x]Hypertension (I10)  []Hyperlipidemia (E78.5)  []Angina pectoris (I20)  []Atherosclerosis (I70)   Musculoskeletal conditions   []Disc pathology   []Congenital spine pathologies   []Prior surgical intervention  []Osteoporosis (M81.8)  []Osteopenia (M85.8)   Endocrine conditions   []Hypothyroid (E03.9)  []Hyperthyroid Gastrointestinal conditions   []Constipation (C93.07)   Metabolic conditions   []Morbid obesity (E66.01)  []Diabetes type 1(E10.65) or 2 (E11.65)   []Neuropathy (G60.9)     Pulmonary conditions   []Asthma (J45)  []Coughing   []COPD (J44.9)   Psychological Disorders  []Anxiety (F41.9)  []Depression (F32.9)   []Other:   [x]Other:   1/2 pack-a-day smoker. Barriers to/and or personal factors that will affect rehab potential:              [x]Age  []Sex              []Motivation/Lack of Motivation                        [x]Co-Morbidities              []Cognitive Function, education/learning barriers              []Environmental, home barriers              []profession/work barriers  []past PT/medical experience  [x]other: 1/2 pack-a-day smoker. Justification: Increasing age and factors that affect tissue healing include consistent smoking habit and high BP indicate increased likelihood for prolonged prognosis for full, safe return to PLOF. Falls Risk Assessment (30 days):   [x] Falls Risk assessed and no intervention required. [] Falls Risk assessed and Patient requires intervention due to being higher risk   TUG score (>12s at risk):     [] Falls education provided, including        ASSESSMENT: Patient is a 69 y/o F who reports to PT approximately 7-8 weeks s/p fall resulting in closed, mildly displaced L glenoid fracture. Patient demonstrates significant deficits in L shoulder ROM, GH joint mobility, strength, and function. Patient will benefit from skilled PT to address deficits to enable full, safe return to PLOF pain free and without restrictions.     Functional Impairments   [x]Noted spinal or UE joint hypomobility   []Noted spinal or UE joint hypermobility   [x]Decreased UE functional ROM   [x]Decreased UE functional strength   []Abnormal reflexes/sensation/myotomal/dermatomal deficits   [x]Decreased RC/scapular/core strength and neuromuscular control   []other:      Functional Activity Limitations (from functional present problem with:  [] no personal factors and/or comorbidities that impact the plan of care;  [x]1-2 personal factors and/or comorbidities that impact the plan of care  []3 personal factors and/or comorbidities that impact the plan of care  [] An examination of body systems using standardized tests and measures addressing any of the following: body structures and functions (impairments), activity limitations, and/or participation restrictions;:  [x] a total of 1-2 or more elements   [] a total of 3 or more elements   [] a total of 4 or more elements   [] A clinical presentation with:  [x] stable and/or uncomplicated characteristics   [] evolving clinical presentation with changing characteristics  [] unstable and unpredictable characteristics;   [] Clinical decision making of [x] low, [] moderate, [] high complexity using standardized patient assessment instrument and/or measurable assessment of functional outcome. [x] EVAL (LOW) 93129 (typically 30 minutes face-to-face)  [] EVAL (MOD) 34977 (typically 30 minutes face-to-face)  [] EVAL (HIGH) 37560 (typically 45 minutes face-to-face)  [] RE-EVAL     PLAN:  Begin PT to address L shoulder ROM, joint mobility, strength, stability, motor control, and function as amy. Frequency/Duration:  2 days per week for 6 Weeks:  INTERVENTIONS:  [x] Therapeutic exercise including: strength training, ROM, for Upper extremity and core   [x]  NMR activation and proprioception for UE, scap and Core   [x] Manual therapy as indicated for shoulder, scapula and spine to include: Dry Needling/IASTM, STM, PROM, Gr I-IV mobilizations, manipulation. [x] Modalities as needed that may include: thermal agents, E-stim, Biofeedback, US, iontophoresis as indicated  [x] Patient education on joint protection, postural re-education, activity modification, progression of HEP. HEP instruction: (see scanned forms)    GOALS:  Patient stated goal: Be able to lift my left arm over my head.   [x] Progressing: [] Met: [] Not Met: [] Adjusted    Therapist goals for Patient:   Short Term Goals: To be achieved in: 2 weeks  1. Independent in HEP and progression per patient tolerance, in order to prevent re-injury. [x] Progressing: [] Met: [] Not Met: [] Adjusted  2. Patient will have a decrease in pain to facilitate improvement in movement, function, and ADLs as indicated by Functional Deficits. [x] Progressing: [] Met: [] Not Met: [] Adjusted    Long Term Goals: To be achieved in: 6 weeks  1. Patient will demonstrate increased pain free AROM to within 5 degrees of uninvolved dominant R UE to allow for proper joint functioning as indicated by patients Functional Deficits. [x] Progressing: [] Met: [] Not Met: [] Adjusted  2. Patient will demonstrate an increase in L UE strength to within 5# HHD compared to uninvolved dominant R UE to allow for proper functional mobility as indicated by patients Functional Deficits. [x] Progressing: [] Met: [] Not Met: [] Adjusted  3. Patient will be able to perform all ADLs, self care tasks, and household chores without increased symptoms or restriction. [x] Progressing: [] Met: [] Not Met: [] Adjusted  4. Patient will be able to put dishes away in a high cabinet without increased symptoms or restriction.   [x] Progressing: [] Met: [] Not Met: [] Adjusted     Electronically signed by:  Ronnie Cowart, PT

## 2021-03-29 NOTE — FLOWSHEET NOTE
[facility]  Orthopaedics and Sports Rehabilitation, [place]    Physical Therapy Treatment Note/ Progress Report:     Date:  3/29/2021    Patient Name:  Parveen Sandra    :  1940  MRN: [id number]  Medical/Treatment Diagnosis Information:  · Diagnosis: S42.14 Fracture of glenoid cavity of scapula; M25.512 L shoulder pain  · Treatment Diagnosis: S42.14 Fracture of glenoid cavity of scapula; M25.512 L shoulder pain  Insurance/Certification information:  PT Insurance Information: [place] (Medicare) - BMN  Physician Information:  Referring Practitioner: Viktoria Morin of care signed (Y/N):     Date of Patient follow up with Physician:      Progress Report: [x]  Yes  []  No     Functional Scale: 5% disability - Quick Dash; 6% disability - UEFI (75/80)   Date: 3/29/21    Date Range for reporting period:  Beginning:  3/29/21  Ending:  NA    Progress report due (10 Rx/or 30 days whichever is less):      Recertification due (POC duration/ or 90 days whichever is less): 21     Visit # Insurance Allowable Auth Needed   1 [place] (Medicare) - BMN [x]Yes    []No     Pain level:  4/10 at start of session. 0/10 at best. 7/10 at worst.      SUBJECTIVE:  See eval    OBJECTIVE: See eval   Observation:    Test measurements:      RESTRICTIONS/PRECAUTIONS: High BP. 1 pack-a-day smoker. L closed mildly displaced glenoid fracture from mid-February. Exercises/Interventions:   Therapeutic Exercise  Min:  20 Wt / Resistance Sets/sec Reps Notes   Supine cane ER - towel roll under distal upper arm  10s 10    Supine cane flexion  5-10s 10    Wall slides flexion  5-10s 10    TB rows green 2 10    TB pulldowns green 2 10           Patient education.     Findings, purpose, focus, goals, expectations of PT; HEP                                                       Therapeutic Activities  Min:  0                                                                      Manual Intervention  Min:  10       L shoulder PROM L GH joint glides - inferior, posterior    Gr. II                                      NMR Re-education  Min:  0                                                                   Therapeutic Exercise and NMR EXR  [x] (68485) Provided verbal/tactile cueing for activities related to strengthening, flexibility, endurance, ROM  for improvements in scapular, scapulothoracic and UE control with self care, reaching, carrying, lifting, house/yardwork, driving/computer work. [x] (88821) Provided verbal/tactile cueing for activities related to improving balance, coordination, kinesthetic sense, posture, motor skill, proprioception  to assist with  scapular, scapulothoracic and UE control with self care, reaching, carrying, lifting, house/yardwork, driving/computer work. Therapeutic Activities:    [x] (48906 or 45583) Provided verbal/tactile cueing for activities related to improving balance, coordination, kinesthetic sense, posture, motor skill, proprioception and motor activation to allow for proper function of scapular, scapulothoracic and UE control with self care, carrying, lifting, driving/computer work.      Home Exercise Program:    [x] (97633) Reviewed/Progressed HEP activities related to strengthening, flexibility, endurance, ROM of scapular, scapulothoracic and UE control with self care, reaching, carrying, lifting, house/yardwork, driving/computer work  [x] (77402) Reviewed/Progressed HEP activities related to improving balance, coordination, kinesthetic sense, posture, motor skill, proprioception of scapular, scapulothoracic and UE control with self care, reaching, carrying, lifting, house/yardwork, driving/computer work      Manual Treatments:  PROM / STM / Oscillations-Mobs:  G-I, II, III, IV (PA's, Inf., Post.)  [x] (12499) Provided manual therapy to mobilize soft tissue/joints of cervical/CT, scapular GHJ and UE for the purpose of modulating pain, promoting relaxation,  increasing ROM, reducing/eliminating soft tissue swelling/inflammation/restriction, improving soft tissue extensibility and allowing for proper ROM for normal function with self care, reaching, carrying, lifting, house/yardwork, driving/computer work    Modalities:      Charges:  Timed Code Treatment Minutes: 30   Total Treatment Minutes: 50       [x] EVAL (LOW) 99149 (typically 20 minutes face-to-face)  [] EVAL (MOD) 27517 (typically 30 minutes face-to-face)  [] EVAL (HIGH) 48896 (typically 45 minutes face-to-face)  [] RE-EVAL     [x] FE(84288) x 1    [] IONTO (27906)  [] NMR (51947) x     [] VASO (80372)  [x] Manual (00042) x 1    [] Other:  [] TA (29172)x     [] Mech Traction (45905)  [] ES(attended) (25743)     [] ES (un) (17107): If [facility] Please Indicate Time In/Out  CPT Code Time in Time out                                     GOALS:  Patient stated goal: Be able to lift my left arm over my head. [x] Progressing: [] Met: [] Not Met: [] Adjusted    Therapist goals for Patient:   Short Term Goals: To be achieved in: 2 weeks  1. Independent in HEP and progression per patient tolerance, in order to prevent re-injury. [x] Progressing: [] Met: [] Not Met: [] Adjusted  2. Patient will have a decrease in pain to facilitate improvement in movement, function, and ADLs as indicated by Functional Deficits. [x] Progressing: [] Met: [] Not Met: [] Adjusted    Long Term Goals: To be achieved in: 6 weeks  1. Patient will demonstrate increased pain free AROM to within 5 degrees of uninvolved dominant R UE to allow for proper joint functioning as indicated by patients Functional Deficits. [x] Progressing: [] Met: [] Not Met: [] Adjusted  2. Patient will demonstrate an increase in L UE strength to within 5# HHD compared to uninvolved dominant R UE to allow for proper functional mobility as indicated by patients Functional Deficits. [x] Progressing: [] Met: [] Not Met: [] Adjusted  3.  Patient will be able to perform all ADLs, self care tasks, and household chores without increased symptoms or restriction. [x] Progressing: [] Met: [] Not Met: [] Adjusted  4. Patient will be able to put dishes away in a high cabinet without increased symptoms or restriction. [x] Progressing: [] Met: [] Not Met: [] Adjusted    Progression Towards Functional goals:  [] Patient is progressing as expected towards functional goals listed. [] Progression is slowed due to complexities listed. [] Progression has been slowed due to co-morbidities. [x] Plan just implemented, too soon to assess goals progression  [] Other:     ASSESSMENT:  See eval    Return to Play: (if applicable)   []  Stage 1: Intro to Strength   []  Stage 2: Dynamic Strength and Intro to Plyometrics   []  Stage 3: Advanced Plyometrics and Intro to Throwing   []  Stage 4: Sport specific Training/Return to Sport     []  Ready to Return to Play, [employer]   []  Not Ready for Return to Sports   Comments:      Treatment/Activity Tolerance:  [x] Patient tolerated treatment well [] Patient limited by fatique  [] Patient limited by pain  [] Patient limited by other medical complications  [] Other:     Overall Progression Towards Functional goals/ Treatment Progress Update:  [] Patient is progressing as expected towards functional goals listed. [] Progression is slowed due to complexities/Impairments listed. [] Progression has been slowed due to co-morbidities.   [x] Plan just implemented, too soon to assess goals progression <30days   [] Goals require adjustment due to lack of progress  [] Patient is not progressing as expected and requires additional follow up with physician  [] Other    Prognosis for POC: [x] Good [] Fair  [] Poor    Patient requires continued skilled intervention: [x] Yes  [] No      PLAN: See eval  [] Continue per plan of care [] Alter current plan (see comments)  [x] Plan of care initiated [] Hold pending MD visit [] Discharge    Electronically signed by: Esdras Armas Miguel Ángel Smith, PT     Note: If patient does not return for scheduled/recommended follow up visits, this note will serve as a discharge from care along with the most recent update on progress.

## 2021-04-05 ENCOUNTER — HOSPITAL ENCOUNTER (OUTPATIENT)
Dept: PHYSICAL THERAPY | Age: 81
Setting detail: THERAPIES SERIES
Discharge: HOME | End: 2021-04-05
Payer: MEDICARE

## 2021-04-05 PROCEDURE — 97110 THERAPEUTIC EXERCISES: CPT

## 2021-04-05 PROCEDURE — 97140 MANUAL THERAPY 1/> REGIONS: CPT

## 2021-04-05 NOTE — FLOWSHEET NOTE
[address] and Sports Rehabilitation, [place]    Physical Therapy Treatment Note/ Progress Report:     Date:  2021    Patient Name:  Gabriela Hameed    :  1940  MRN: [id number]  Medical/Treatment Diagnosis Information:  · Diagnosis: S42.14 Fracture of glenoid cavity of scapula; M25.512 L shoulder pain  · Treatment Diagnosis: S42.14 Fracture of glenoid cavity of scapula; M25.512 L shoulder pain  Insurance/Certification information:  PT Insurance Information: [place] (Medicare) - BMN  Physician Information:  Referring Practitioner: Lu Pascal of care signed (Y/N):     Date of Patient follow up with Physician:      Progress Report: []  Yes  [x]  No     Functional Scale: 5% disability - Quick Dash; 6% disability - UEFI (75/80)   Date: 3/29/21    Date Range for reporting period:  Beginning:  3/29/21  Ending:  NA    Progress report due (10 Rx/or 30 days whichever is less): 6/96/15     Recertification due (POC duration/ or 90 days whichever is less): 21     Visit # Insurance Allowable Auth Needed   2 [place] (Medicare) - BMN [x]Yes    []No     Pain level:  0/10    SUBJECTIVE:  + compliance with HEP. Pain has been much less since initial session besides some occasional achiness on the outside of the L upper arm when she wakes up in the morning. Patient thinks she may be sleeping on her L side intermittently without realizing it. OBJECTIVE:    Observation:    Test measurements:      RESTRICTIONS/PRECAUTIONS: High BP. 1 pack-a-day smoker. L closed mildly displaced glenoid fracture from mid-February.     Exercises/Interventions:   Therapeutic Exercise  Min:  20 Wt / Resistance Sets/sec Reps Notes   Pulleys flexion/scaption   4 min    Supine cane ER - towel roll under distal upper arm  10s 10    Supine cane flexion  5-10s 10    Wall slides flexion  5-10s 10    TB rows green 2 10    TB pulldowns green 2 10    UK deltoid    5 min    Supine SA punch NR 2 10    Sidelying ER to neutral NR 2 10    Sidelying SA punch NR 2 10    Cable column pulldowns lvl 2 2 10    Cable column rows lvl 2  2 10                            Therapeutic Activities  Min:  0                                                                      Manual Intervention  Min:  15       L shoulder PROM       L GH joint glides - inferior, posterior    Gr. II                                      NMR Re-education  Min:  0                                                                   Therapeutic Exercise and NMR EXR  [x] (81050) Provided verbal/tactile cueing for activities related to strengthening, flexibility, endurance, ROM  for improvements in scapular, scapulothoracic and UE control with self care, reaching, carrying, lifting, house/yardwork, driving/computer work. [x] (87246) Provided verbal/tactile cueing for activities related to improving balance, coordination, kinesthetic sense, posture, motor skill, proprioception  to assist with  scapular, scapulothoracic and UE control with self care, reaching, carrying, lifting, house/yardwork, driving/computer work. Therapeutic Activities:    [x] (84035 or 66343) Provided verbal/tactile cueing for activities related to improving balance, coordination, kinesthetic sense, posture, motor skill, proprioception and motor activation to allow for proper function of scapular, scapulothoracic and UE control with self care, carrying, lifting, driving/computer work.      Home Exercise Program:    [x] (30788) Reviewed/Progressed HEP activities related to strengthening, flexibility, endurance, ROM of scapular, scapulothoracic and UE control with self care, reaching, carrying, lifting, house/yardwork, driving/computer work  [x] (14630) Reviewed/Progressed HEP activities related to improving balance, coordination, kinesthetic sense, posture, motor skill, proprioception of scapular, scapulothoracic and UE control with self care, reaching, carrying, lifting, house/yardwork, driving/computer work Manual Treatments:  PROM / STM / Oscillations-Mobs:  G-I, II, III, IV (PA's, Inf., Post.)  [x] (36099) Provided manual therapy to mobilize soft tissue/joints of cervical/CT, scapular GHJ and UE for the purpose of modulating pain, promoting relaxation,  increasing ROM, reducing/eliminating soft tissue swelling/inflammation/restriction, improving soft tissue extensibility and allowing for proper ROM for normal function with self care, reaching, carrying, lifting, house/yardwork, driving/computer work    Modalities:      Charges:  Timed Code Treatment Minutes: 45   Total Treatment Minutes: 45       [] EVAL (LOW) 82294 (typically 20 minutes face-to-face)  [] EVAL (MOD) 57770 (typically 30 minutes face-to-face)  [] EVAL (HIGH) 08647 (typically 45 minutes face-to-face)  [] RE-EVAL     [x] DM(29107) x 2    [] IONTO (49830)  [] NMR (42246) x     [] VASO (06585)  [x] Manual (03357) x 1    [] Other:  [] TA (24846)x     [] Mech Traction (34662)  [] ES(attended) (04858)     [] ES (un) (06494): If [facility] Please Indicate Time In/Out  CPT Code Time in Time out                                     GOALS:  Patient stated goal: Be able to lift my left arm over my head. [x] Progressing: [] Met: [] Not Met: [] Adjusted    Therapist goals for Patient:   Short Term Goals: To be achieved in: 2 weeks  1. Independent in HEP and progression per patient tolerance, in order to prevent re-injury. [x] Progressing: [] Met: [] Not Met: [] Adjusted  2. Patient will have a decrease in pain to facilitate improvement in movement, function, and ADLs as indicated by Functional Deficits. [x] Progressing: [] Met: [] Not Met: [] Adjusted    Long Term Goals: To be achieved in: 6 weeks  1. Patient will demonstrate increased pain free AROM to within 5 degrees of uninvolved dominant R UE to allow for proper joint functioning as indicated by patients Functional Deficits. [x] Progressing: [] Met: [] Not Met: [] Adjusted  2.  Patient will demonstrate an increase in L UE strength to within 5# HHD compared to uninvolved dominant R UE to allow for proper functional mobility as indicated by patients Functional Deficits. [x] Progressing: [] Met: [] Not Met: [] Adjusted  3. Patient will be able to perform all ADLs, self care tasks, and household chores without increased symptoms or restriction. [x] Progressing: [] Met: [] Not Met: [] Adjusted  4. Patient will be able to put dishes away in a high cabinet without increased symptoms or restriction. [x] Progressing: [] Met: [] Not Met: [] Adjusted    Progression Towards Functional goals:  [x] Patient is progressing as expected towards functional goals listed. [] Progression is slowed due to complexities listed. [] Progression has been slowed due to co-morbidities. [] Plan just implemented, too soon to assess goals progression  [] Other:     ASSESSMENT:  Patient reports significant improvement in overall pain levels since initial visit. + HEP compliance. Continued manual therapy techniques and self stretching to address ROM/mobility restrictions. Initiated some light L shoulder strengthening tasks today with minimal resistance to assess tolerance. Educated patient that she is safe to use L UE for any ADLs under 15# performed below shoulder height at this time. Encouraged her to start using L UE during these safe ADLs as tolerated.      Return to Play: (if applicable)   []  Stage 1: Intro to Strength   []  Stage 2: Dynamic Strength and Intro to Plyometrics   []  Stage 3: Advanced Plyometrics and Intro to Throwing   []  Stage 4: Sport specific Training/Return to Sport     []  Ready to Return to Play, [employer]   []  Not Ready for Return to Sports   Comments:      Treatment/Activity Tolerance:  [x] Patient tolerated treatment well [] Patient limited by fatique  [] Patient limited by pain  [] Patient limited by other medical complications  [] Other:     Overall Progression Towards Functional goals/ Treatment Progress Update:  [x] Patient is progressing as expected towards functional goals listed. [] Progression is slowed due to complexities/Impairments listed. [] Progression has been slowed due to co-morbidities. [] Plan just implemented, too soon to assess goals progression <30days   [] Goals require adjustment due to lack of progress  [] Patient is not progressing as expected and requires additional follow up with physician  [] Other    Prognosis for POC: [x] Good [] Fair  [] Poor    Patient requires continued skilled intervention: [x] Yes  [] No      PLAN: See eval  [x] Continue per plan of care [] Alter current plan (see comments)  [] Plan of care initiated [] Hold pending MD visit [] Discharge    Electronically signed by: Armando Taylor PT     Note: If patient does not return for scheduled/recommended follow up visits, this note will serve as a discharge from care along with the most recent update on progress.

## 2021-04-08 ENCOUNTER — HOSPITAL ENCOUNTER (OUTPATIENT)
Dept: PHYSICAL THERAPY | Age: 81
Setting detail: THERAPIES SERIES
Discharge: HOME | End: 2021-04-08
Payer: MEDICARE

## 2021-04-08 PROCEDURE — 97140 MANUAL THERAPY 1/> REGIONS: CPT

## 2021-04-08 PROCEDURE — 97110 THERAPEUTIC EXERCISES: CPT

## 2021-04-12 ENCOUNTER — HOSPITAL ENCOUNTER (OUTPATIENT)
Dept: PHYSICAL THERAPY | Age: 81
Setting detail: THERAPIES SERIES
Discharge: HOME | End: 2021-04-12
Payer: MEDICARE

## 2021-04-12 PROCEDURE — 97140 MANUAL THERAPY 1/> REGIONS: CPT

## 2021-04-12 PROCEDURE — 97110 THERAPEUTIC EXERCISES: CPT

## 2021-04-12 NOTE — FLOWSHEET NOTE
[address] and Sports Rehabilitation, [place]    Physical Therapy Treatment Note/ Progress Report:     Date:  2021    Patient Name:  Marizol Cummins    :  1940  MRN: [id number]  Medical/Treatment Diagnosis Information:  · Diagnosis: S42.14 Fracture of glenoid cavity of scapula; M25.512 L shoulder pain  · Treatment Diagnosis: S42.14 Fracture of glenoid cavity of scapula; M25.512 L shoulder pain  Insurance/Certification information:  PT Insurance Information: [place] (Medicare) - BMN  Physician Information:  Referring Practitioner: Christian Mead of care signed (Y/N):     Date of Patient follow up with Physician:      Progress Report: []  Yes  [x]  No     Functional Scale: 5% disability - Quick Dash; 6% disability - UEFI (75/80)   Date: 3/29/21    Date Range for reporting period:  Beginning:  3/29/21  Ending:  NA    Progress report due (10 Rx/or 30 days whichever is less):      Recertification due (POC duration/ or 90 days whichever is less): 21     Visit # Insurance Allowable Auth Needed   4 [place] (Medicare) - BMN [x]Yes    []No     Pain level:  0/10    SUBJECTIVE:  Patient able to use self propelled mower over the weekend with no discomfort in her L shoulder. + HEP compliance. Patient a little sore in her L shoulder with stretching activities, but does not linger afterwards. OBJECTIVE:    Observation:    Test measurements:         RESTRICTIONS/PRECAUTIONS: High BP. 1 pack-a-day smoker. L closed mildly displaced glenoid fracture from mid-February.     Exercises/Interventions:   Therapeutic Exercise  Min:  27 Wt / Resistance Sets/sec Reps Notes   Pulleys flexion/scaption   4 min    Supine cane ER - towel roll under distal upper arm  10s 10 HEP   Supine cane flexion  10s 10    Wall slides flexion End range focus 10s 10    Cross body stretch  20s 5    Doorway stretch  20s 5    IR towel stretch behind back  5-10s 10    TB rows green 2 10    TB pulldowns green 2 10 to improving balance, coordination, kinesthetic sense, posture, motor skill, proprioception of scapular, scapulothoracic and UE control with self care, reaching, carrying, lifting, house/yardwork, driving/computer work      Manual Treatments:  PROM / STM / Oscillations-Mobs:  G-I, II, III, IV (PA's, Inf., Post.)  [x] (59292) Provided manual therapy to mobilize soft tissue/joints of cervical/CT, scapular GHJ and UE for the purpose of modulating pain, promoting relaxation,  increasing ROM, reducing/eliminating soft tissue swelling/inflammation/restriction, improving soft tissue extensibility and allowing for proper ROM for normal function with self care, reaching, carrying, lifting, house/yardwork, driving/computer work    Modalities:      Charges:  Timed Code Treatment Minutes: 45   Total Treatment Minutes: 45       [] EVAL (LOW) 37741 (typically 20 minutes face-to-face)  [] EVAL (MOD) 84321 (typically 30 minutes face-to-face)  [] EVAL (HIGH) 69110 (typically 45 minutes face-to-face)  [] RE-EVAL     [x] IP(74149) x 2    [] IONTO (45524)  [] NMR (88720) x     [] VASO (03325)  [x] Manual (51151) x 1    [] Other:  [] TA (77791)x     [] Mech Traction (84949)  [] ES(attended) (79062)     [] ES (un) (04368): If [facility] Please Indicate Time In/Out  CPT Code Time in Time out                                     GOALS:  Patient stated goal: Be able to lift my left arm over my head. [x] Progressing: [] Met: [] Not Met: [] Adjusted    Therapist goals for Patient:   Short Term Goals: To be achieved in: 2 weeks  1. Independent in HEP and progression per patient tolerance, in order to prevent re-injury. [x] Progressing: [] Met: [] Not Met: [] Adjusted  2. Patient will have a decrease in pain to facilitate improvement in movement, function, and ADLs as indicated by Functional Deficits. [x] Progressing: [] Met: [] Not Met: [] Adjusted    Long Term Goals: To be achieved in: 6 weeks  1.  Patient will demonstrate increased pain free

## 2021-04-15 ENCOUNTER — HOSPITAL ENCOUNTER (OUTPATIENT)
Dept: PHYSICAL THERAPY | Age: 81
Setting detail: THERAPIES SERIES
Discharge: HOME | End: 2021-04-15
Payer: MEDICARE

## 2021-04-15 PROCEDURE — 97140 MANUAL THERAPY 1/> REGIONS: CPT

## 2021-04-15 PROCEDURE — 97110 THERAPEUTIC EXERCISES: CPT

## 2021-04-15 NOTE — FLOWSHEET NOTE
[address] [facility], [place]    Physical Therapy Treatment Note/ Progress Report:     Date:  4/15/2021    Patient Name:  Heather Dunham    :  1940  MRN: [id number]  Medical/Treatment Diagnosis Information:  · Diagnosis: S42.14 Fracture of glenoid cavity of scapula; M25.512 L shoulder pain  · Treatment Diagnosis: S42.14 Fracture of glenoid cavity of scapula; M25.512 L shoulder pain  Insurance/Certification information:  PT Insurance Information: [employer] (Medicare) - BMN  Physician Information:  Referring Practitioner: Brandon Ventura signed (Y/N):     Date of Patient follow up with Physician:      Progress Report: []  Yes  [x]  No     Functional Scale: 5% disability - Quick Dash; 6% disability - UEFI (75/80)   Date: 3/29/21    Date Range for reporting period:  Beginning:  3/29/21  Ending:  NA    Progress report due (10 Rx/or 30 days whichever is less):      Recertification due (POC duration/ or 90 days whichever is less): 21     Visit # Insurance Allowable Auth Needed   5 [employer] (Medicare) - BMN [x]Yes    []No     Pain level:  0/10    SUBJECTIVE:   Notes quite a bit of muscle soreness present on the outside of her L shoulder and upper arm after advancements last session. Soreness came on the next day following session and lasted about 24 hours, but did not limit patient from performing any of her usual daily activities. OBJECTIVE:    Observation:    Test measurements:         RESTRICTIONS/PRECAUTIONS: High BP. 1 pack-a-day smoker. L closed mildly displaced glenoid fracture from mid-February.     Exercises/Interventions:   Therapeutic Exercise  Min:  27 Wt / Resistance Sets/sec Reps Notes   Pulleys flexion/scaption   4 min    Supine cane ER - towel roll under distal upper arm  10s 10 HEP   Supine cane flexion  10s 10    Wall slides flexion End range focus 10s 10    Cross body stretch  20s 5    Doorway stretch  20s 5    IR towel stretch behind back 5-10s 10    TB rows green 2 10    TB pulldowns green 2 10    UK deltoid  1#  3 min    Supine SA punch 2# 2 10 Focus on motor control   Sidelying ER to neutral 1# 2 10    Sidelying SA punch 1# 2 10    Cable column pulldowns lvl 2 + weight 2 10    Cable column rows lvl 3  2 10                                   Therapeutic Activities  Min:  0                                                                      Manual Intervention  Min:  15       L shoulder PROM       L GH joint glides - inferior, posterior    Gr. II - III   Scapular mobs    Gr. II-III                               NMR Re-education  Min:  3       Wall ball rolls cw/ccw 2# weighted ball 1 20 each direction    Body blade IR/ER   nv                                                  Therapeutic Exercise and NMR EXR  [x] (24154) Provided verbal/tactile cueing for activities related to strengthening, flexibility, endurance, ROM  for improvements in scapular, scapulothoracic and UE control with self care, reaching, carrying, lifting, house/yardwork, driving/computer work. [x] (36344) Provided verbal/tactile cueing for activities related to improving balance, coordination, kinesthetic sense, posture, motor skill, proprioception  to assist with  scapular, scapulothoracic and UE control with self care, reaching, carrying, lifting, house/yardwork, driving/computer work. Therapeutic Activities:    [x] (40420 or 18055) Provided verbal/tactile cueing for activities related to improving balance, coordination, kinesthetic sense, posture, motor skill, proprioception and motor activation to allow for proper function of scapular, scapulothoracic and UE control with self care, carrying, lifting, driving/computer work.      Home Exercise Program:    [x] (68891) Reviewed/Progressed HEP activities related to strengthening, flexibility, endurance, ROM of scapular, scapulothoracic and UE control with self care, reaching, carrying, lifting, house/yardwork, driving/computer work  [x] (12232) Reviewed/Progressed HEP activities related to improving balance, coordination, kinesthetic sense, posture, motor skill, proprioception of scapular, scapulothoracic and UE control with self care, reaching, carrying, lifting, house/yardwork, driving/computer work      Manual Treatments:  PROM / STM / Oscillations-Mobs:  G-I, II, III, IV (PA's, Inf., Post.)  [x] (41496) Provided manual therapy to mobilize soft tissue/joints of cervical/CT, scapular GHJ and UE for the purpose of modulating pain, promoting relaxation,  increasing ROM, reducing/eliminating soft tissue swelling/inflammation/restriction, improving soft tissue extensibility and allowing for proper ROM for normal function with self care, reaching, carrying, lifting, house/yardwork, driving/computer work    Modalities:      Charges:  Timed Code Treatment Minutes: 45   Total Treatment Minutes: 45       [] EVAL (LOW) 65965 (typically 20 minutes face-to-face)  [] EVAL (MOD) 19531 (typically 30 minutes face-to-face)  [] EVAL (HIGH) 59951 (typically 45 minutes face-to-face)  [] RE-EVAL     [x] TG(15940) x 2    [] IONTO (74495)  [] NMR (58045) x     [] VASO (93752)  [x] Manual ([id number]) x 1    [] Other:  [] TA (21784)x     [] Mech Traction (56033)  [] ES(attended) (70403)     [] ES (un) (23639): If BW Please Indicate Time In/Out  CPT Code Time in Time out                                     GOALS:  Patient stated goal: Be able to lift my left arm over my head. [x] Progressing: [] Met: [] Not Met: [] Adjusted    Therapist goals for Patient:   Short Term Goals: To be achieved in: 2 weeks  1. Independent in HEP and progression per patient tolerance, in order to prevent re-injury. [x] Progressing: [] Met: [] Not Met: [] Adjusted  2. Patient will have a decrease in pain to facilitate improvement in movement, function, and ADLs as indicated by Functional Deficits. [x] Progressing: [] Met: [] Not Met: [] Adjusted    Long Term Goals:  To be achieved in: 6 weeks  1. Patient will demonstrate increased pain free AROM to within 5 degrees of uninvolved dominant R UE to allow for proper joint functioning as indicated by patients Functional Deficits. [x] Progressing: [] Met: [] Not Met: [] Adjusted  2. Patient will demonstrate an increase in L UE strength to within 5# HHD compared to uninvolved dominant R UE to allow for proper functional mobility as indicated by patients Functional Deficits. [x] Progressing: [] Met: [] Not Met: [] Adjusted  3. Patient will be able to perform all ADLs, self care tasks, and household chores without increased symptoms or restriction. [x] Progressing: [] Met: [] Not Met: [] Adjusted  4. Patient will be able to put dishes away in a high cabinet without increased symptoms or restriction. [x] Progressing: [] Met: [] Not Met: [] Adjusted    Progression Towards Functional goals:  [x] Patient is progressing as expected towards functional goals listed. [] Progression is slowed due to complexities listed. [] Progression has been slowed due to co-morbidities. [] Plan just implemented, too soon to assess goals progression  [] Other:     ASSESSMENT:  L shoulder ROM consistently progressing every session with manual techniques and self stretches. Minimal progression of POC today due to significant muscle soreness following advancements last session. Will continue to progress strength, stability, and motor control tasks as amy.     Return to Play: (if applicable)   []  Stage 1: Intro to Strength   []  Stage 2: Dynamic Strength and Intro to Plyometrics   []  Stage 3: Advanced Plyometrics and Intro to Throwing   []  Stage 4: Sport specific Training/Return to Sport     []  Ready to Return to Play, [employer]   []  Not Ready for Return to Sports   Comments:      Treatment/Activity Tolerance:  [x] Patient tolerated treatment well [] Patient limited by fatique  [] Patient limited by pain  [] Patient limited by other medical complications  [] Other:     Overall Progression Towards Functional goals/ Treatment Progress Update:  [x] Patient is progressing as expected towards functional goals listed. [] Progression is slowed due to complexities/Impairments listed. [] Progression has been slowed due to co-morbidities. [] Plan just implemented, too soon to assess goals progression <30days   [] Goals require adjustment due to lack of progress  [] Patient is not progressing as expected and requires additional follow up with physician  [] Other    Prognosis for POC: [x] Good [] Fair  [] Poor    Patient requires continued skilled intervention: [x] Yes  [] No      PLAN: See eval  [x] Continue per plan of care [] Alter current plan (see comments)  [] Plan of care initiated [] Hold pending MD visit [] Discharge    Electronically signed by: Reggie Gonzalez PT     Note: If patient does not return for scheduled/recommended follow up visits, this note will serve as a discharge from care along with the most recent update on progress.

## 2021-04-19 ENCOUNTER — HOSPITAL ENCOUNTER (OUTPATIENT)
Dept: PHYSICAL THERAPY | Age: 81
Setting detail: THERAPIES SERIES
Discharge: HOME | End: 2021-04-19
Payer: MEDICARE

## 2021-04-19 PROCEDURE — 97140 MANUAL THERAPY 1/> REGIONS: CPT

## 2021-04-19 PROCEDURE — 97112 NEUROMUSCULAR REEDUCATION: CPT

## 2021-04-19 PROCEDURE — 97110 THERAPEUTIC EXERCISES: CPT

## 2021-04-19 NOTE — FLOWSHEET NOTE
[address] [facility], [place]    Physical Therapy Treatment Note/ Progress Report:     Date:  2021    Patient Name:  Amee Galan    :  1940  MRN: [id number]  Medical/Treatment Diagnosis Information:  · Diagnosis: S42.14 Fracture of glenoid cavity of scapula; M25.512 L shoulder pain  · Treatment Diagnosis: S42.14 Fracture of glenoid cavity of scapula; M25.512 L shoulder pain  Insurance/Certification information:  PT Insurance Information: [employer] (Medicare) - BMN  Physician Information:  Referring Practitioner: Edilma Luz of care signed (Y/N):     Date of Patient follow up with Physician:      Progress Report: []  Yes  [x]  No     Functional Scale: 5% disability - Quick Dash; 6% disability - UEFI (75/80)   Date: 3/29/21    Date Range for reporting period:  Beginning:  3/29/21  Ending:  NA    Progress report due (10 Rx/or 30 days whichever is less):      Recertification due (POC duration/ or 90 days whichever is less): 21     Visit # Insurance Allowable Auth Needed   6 [employer] [place] (Medicare) - BMN [x]Yes    []No     Pain level:  0/10    SUBJECTIVE:   Patient feels like she is doing well. Not really limited with any of her daily tasks at this time. No pain with all ADLs. Fatigued after PT sessions, but no lasting muscle soreness. Patient has a follow up with Dr. Sydnie Brock this Friday. OBJECTIVE:    Observation:    Test measurements:          RESTRICTIONS/PRECAUTIONS: High BP. 1 pack-a-day smoker. L closed mildly displaced glenoid fracture from mid-February.     Exercises/Interventions:   Therapeutic Exercise  Min:  20 Wt / Resistance Sets/sec Reps Notes   Pulleys flexion/scaption   4 min    Supine cane ER - towel roll under distal upper arm  10s 10 HEP   Supine cane flexion  10s 10 D/c to HEP NV   Wall slides flexion End range focus 10s 10    Cross body stretch  20s 5    Doorway stretch  20s 5    IR towel stretch behind back  5-10s 10    TB rows green 2 10    TB pulldowns green 2 10    UK deltoid  1#  3 min    Supine SA punch 2# 2 10 Focus on motor control   Sidelying ER to neutral 1# 2 10    Sidelying SA punch 1# 2 10    Cable column pulldowns lvl 2 + weight 2 10    Cable column rows lvl 3  2 10    TB ER to uppercut red 1 10    TB IR/ER - towel roll under elbow green 2 10 each                     Therapeutic Activities  Min:  0                                                                      Manual Intervention  Min:  15       L shoulder PROM       L GH joint glides - inferior, posterior    Gr. II - III   Scapular mobs    Gr. II-III                               NMR Re-education  Min:  15       Wall ball stabilizations w/perturbations 2# weighted ball To fatigue 3    Body blade IR/ER Towel roll under elbow 10s 5    Wall push up isos on SWB green 5s 10    Standing flexion raises NR 2 10 Mirror feedback to decrease compensatory UT use                                   Therapeutic Exercise and NMR EXR  [x] (78465) Provided verbal/tactile cueing for activities related to strengthening, flexibility, endurance, ROM  for improvements in scapular, scapulothoracic and UE control with self care, reaching, carrying, lifting, house/yardwork, driving/computer work. [x] (79679) Provided verbal/tactile cueing for activities related to improving balance, coordination, kinesthetic sense, posture, motor skill, proprioception  to assist with  scapular, scapulothoracic and UE control with self care, reaching, carrying, lifting, house/yardwork, driving/computer work. Therapeutic Activities:    [x] (98574 or 12122) Provided verbal/tactile cueing for activities related to improving balance, coordination, kinesthetic sense, posture, motor skill, proprioception and motor activation to allow for proper function of scapular, scapulothoracic and UE control with self care, carrying, lifting, driving/computer work.      Home Exercise Program:    [x] (01420) Reviewed/Progressed HEP activities related to strengthening, flexibility, endurance, ROM of scapular, scapulothoracic and UE control with self care, reaching, carrying, lifting, house/yardwork, driving/computer work  [x] (16347) Reviewed/Progressed HEP activities related to improving balance, coordination, kinesthetic sense, posture, motor skill, proprioception of scapular, scapulothoracic and UE control with self care, reaching, carrying, lifting, house/yardwork, driving/computer work      Manual Treatments:  PROM / STM / Oscillations-Mobs:  G-I, II, III, IV (PA's, Inf., Post.)  [x] (81999) Provided manual therapy to mobilize soft tissue/joints of cervical/CT, scapular GHJ and UE for the purpose of modulating pain, promoting relaxation,  increasing ROM, reducing/eliminating soft tissue swelling/inflammation/restriction, improving soft tissue extensibility and allowing for proper ROM for normal function with self care, reaching, carrying, lifting, house/yardwork, driving/computer work    Modalities:      Charges:  Timed Code Treatment Minutes: 50   Total Treatment Minutes: 50       [] EVAL (LOW) 94993 (typically 20 minutes face-to-face)  [] EVAL (MOD) 24709 (typically 30 minutes face-to-face)  [] EVAL (HIGH) 12722 (typically 45 minutes face-to-face)  [] RE-EVAL     [x] AL(45504) x 1    [] IONTO (58328)  [x] NMR (12901) x 1    [] VASO (03097)  [x] Manual (90599) x 1    [] Other:  [] TA (47913)x     [] Mech Traction (39953)  [] ES(attended) (77645)     [] ES (un) (04794): If [facility] Please Indicate Time In/Out  CPT Code Time in Time out                                     GOALS:  Patient stated goal: Be able to lift my left arm over my head. [x] Progressing: [] Met: [] Not Met: [] Adjusted    Therapist goals for Patient:   Short Term Goals: To be achieved in: 2 weeks  1. Independent in HEP and progression per patient tolerance, in order to prevent re-injury. [x] Progressing: [] Met: [] Not Met: [] Adjusted  2.  Patient will have a decrease in pain to facilitate improvement in movement, function, and ADLs as indicated by Functional Deficits. [x] Progressing: [] Met: [] Not Met: [] Adjusted    Long Term Goals: To be achieved in: 6 weeks  1. Patient will demonstrate increased pain free AROM to within 5 degrees of uninvolved dominant R UE to allow for proper joint functioning as indicated by patients Functional Deficits. [x] Progressing: [] Met: [] Not Met: [] Adjusted  2. Patient will demonstrate an increase in L UE strength to within 5# HHD compared to uninvolved dominant R UE to allow for proper functional mobility as indicated by patients Functional Deficits. [x] Progressing: [] Met: [] Not Met: [] Adjusted  3. Patient will be able to perform all ADLs, self care tasks, and household chores without increased symptoms or restriction. [x] Progressing: [] Met: [] Not Met: [] Adjusted  4. Patient will be able to put dishes away in a high cabinet without increased symptoms or restriction. [x] Progressing: [] Met: [] Not Met: [] Adjusted    Progression Towards Functional goals:  [x] Patient is progressing as expected towards functional goals listed. [] Progression is slowed due to complexities listed. [] Progression has been slowed due to co-morbidities. [] Plan just implemented, too soon to assess goals progression  [] Other:     ASSESSMENT:  L shoulder ROM consistently progressing every session with manual techniques and self stretches. Plan to update measures at NV prior to physician follow up on Friday of this week. Able to tolerate significant progression of L shoulder and scapular strengthening tasks during today's session progressing into OH ranges as amy. Patient appropriately fatigued at end of session.      Return to Play: (if applicable)   []  Stage 1: Intro to Strength   []  Stage 2: Dynamic Strength and Intro to Plyometrics   []  Stage 3: Advanced Plyometrics and Intro to Throwing   []  Stage 4: Sport specific Training/Return to Sport     []  Ready to Return to Play, [employer]   []  Not Ready for Return to Sports   Comments:      Treatment/Activity Tolerance:  [x] Patient tolerated treatment well [] Patient limited by fatique  [] Patient limited by pain  [] Patient limited by other medical complications  [] Other:     Overall Progression Towards Functional goals/ Treatment Progress Update:  [x] Patient is progressing as expected towards functional goals listed. [] Progression is slowed due to complexities/Impairments listed. [] Progression has been slowed due to co-morbidities. [] Plan just implemented, too soon to assess goals progression <30days   [] Goals require adjustment due to lack of progress  [] Patient is not progressing as expected and requires additional follow up with physician  [] Other    Prognosis for POC: [x] Good [] Fair  [] Poor    Patient requires continued skilled intervention: [x] Yes  [] No      PLAN: Progress note NV prior to physician follow up. [x] Continue per plan of care [] Alter current plan (see comments)  [] Plan of care initiated [] Hold pending MD visit [] Discharge    Electronically signed by: Armando Taylor PT     Note: If patient does not return for scheduled/recommended follow up visits, this note will serve as a discharge from care along with the most recent update on progress.

## 2021-04-22 ENCOUNTER — APPOINTMENT (OUTPATIENT)
Dept: PHYSICAL THERAPY | Age: 81
End: 2021-04-22
Payer: MEDICARE

## 2021-04-23 ENCOUNTER — OFFICE VISIT (OUTPATIENT)
Dept: ORTHOPEDIC SURGERY | Age: 81
End: 2021-04-23
Payer: MEDICARE

## 2021-04-23 VITALS
WEIGHT: 170 LBS | BODY MASS INDEX: 29.02 KG/M2 | DIASTOLIC BLOOD PRESSURE: 78 MMHG | HEART RATE: 58 BPM | HEIGHT: 64 IN | SYSTOLIC BLOOD PRESSURE: 160 MMHG

## 2021-04-23 DIAGNOSIS — S42.142D CLOSED FRACTURE OF GLENOID CAVITY AND NECK OF LEFT SCAPULA WITH ROUTINE HEALING, SUBSEQUENT ENCOUNTER: Primary | ICD-10-CM

## 2021-04-23 DIAGNOSIS — S42.152D CLOSED FRACTURE OF GLENOID CAVITY AND NECK OF LEFT SCAPULA WITH ROUTINE HEALING, SUBSEQUENT ENCOUNTER: Primary | ICD-10-CM

## 2021-04-23 PROCEDURE — 99213 OFFICE O/P EST LOW 20 MIN: CPT | Performed by: ORTHOPAEDIC SURGERY

## 2021-04-23 NOTE — PATIENT INSTRUCTIONS
Impression:   1. She is doing well at 10 weeks post anterior rim fracture of her left shoulder glenoid. 2.  She does have some limitation of motion but is improving since last office visit. 3.  She has no weakness to suggest a rotator cuff tear. 4.  There have been no symptoms of instability. Plan/Treatment:   1. She will continue her regular home exercise program.  2.  Prognosis was discussed. 3.  She will call if there are any symptoms of instability or pain.       Luis Garcias MD  4/23/2021

## 2021-04-23 NOTE — PROGRESS NOTES
Follow Up Shoulder Evaluation   4/23/2021    Douglas Morris      1940        Alexi Garcia is seen in follow up for Follow-up (LT Shoulder  )       Alexi Garcia returns now approximately 10 weeks post fall on her left shoulder in which she sustained a fracture of the anterior aspect of the glenoid. CT scan performed on 3/15/2021 did show she had mild depression of the anterior 20% of her glenoid. She was treated nonoperatively with physical therapy. She has not completed her therapy and states that she is markedly improved with no pain and no limitation of activities. She denies any history of instability. She continues with her home exercise program.       I have today reviewed with Stephenrickeysmiley Alexandra the clinically relevant past medical history, medications, allergies, family history, and Review of Systems from the patients most recent history form, and I have documented any details relevant to today's presenting complaints in my history above. The patient's self recorded documents concerning the above have been scanned  into the chart under the \"Media\" tab.     BP (!) 160/78   Pulse 58   Ht 5' 4\" (1.626 m)   Wt 170 lb (77.1 kg)   LMP 12/28/1985 (Approximate)   BMI 29.18 kg/m²     PHYSICAL EXAMINATION    General Appearance: no acute distress, alert, oriented x 3, appropriate mood and affect  Atrophy: No  Ecchymosis: No   Errythemia: No  Swelling: No   Deformity: No  Scapular Winging: No  Palpation/Tenderness: no    Neurovascular Status: intact    Pulses (0-4)   Radial    Ulnar   Right     Left 2+      Range of Motion: Degrees   Abduction External Internal Passive Abd   Right 141 90 75    Left 124 50 45       SpecialTest:   Impingement Shruthi Sauceda Crossover Sign Speed Bouch[place]on Subacromial  inj SLAP T    DLST     Right    neg      Left    neg          Strength Rotator Cuff:           Normal=N    Weak=W     Antalgic=A    Supraspinatus  Subscapularis  Infraspinatus  Teres Minor    Right N N N     Left N N N Impression:   1. She is doing well at 10 weeks post anterior rim fracture of her left shoulder glenoid. 2.  She does have some limitation of motion but is improving since last office visit. 3.  She has no weakness to suggest a rotator cuff tear. 4.  There have been no symptoms of instability. Plan/Treatment:   1. She will continue her regular home exercise program.  2.  Prognosis was discussed. 3.  She will call if there are any symptoms of instability or pain. Dougie Tenorio MD  4/23/2021    This dictation was done with Leta dictation and may contain mechanical errors related to translation.

## 2021-04-26 ENCOUNTER — APPOINTMENT (OUTPATIENT)
Dept: PHYSICAL THERAPY | Age: 81
End: 2021-04-26
Payer: MEDICARE

## 2021-06-15 DIAGNOSIS — R73.9 HYPERGLYCEMIA: ICD-10-CM

## 2021-06-15 DIAGNOSIS — I10 ESSENTIAL HYPERTENSION: ICD-10-CM

## 2021-06-15 DIAGNOSIS — E78.00 HYPERCHOLESTEROLEMIA: ICD-10-CM

## 2021-06-15 LAB
A/G RATIO: 1.5 (ref 1.1–2.2)
ALBUMIN SERPL-MCNC: 4.3 G/DL (ref 3.4–5)
ALP BLD-CCNC: 82 U/L (ref 40–129)
ALT SERPL-CCNC: 14 U/L (ref 10–40)
ANION GAP SERPL CALCULATED.3IONS-SCNC: 14 MMOL/L (ref 3–16)
AST SERPL-CCNC: 16 U/L (ref 15–37)
BILIRUB SERPL-MCNC: 0.6 MG/DL (ref 0–1)
BUN BLDV-MCNC: 11 MG/DL (ref 7–20)
CALCIUM SERPL-MCNC: 9.7 MG/DL (ref 8.3–10.6)
CHLORIDE BLD-SCNC: 103 MMOL/L (ref 99–110)
CHOLESTEROL, TOTAL: 191 MG/DL (ref 0–199)
CO2: 24 MMOL/L (ref 21–32)
CREAT SERPL-MCNC: 0.7 MG/DL (ref 0.6–1.2)
GFR AFRICAN AMERICAN: >60
GFR NON-AFRICAN AMERICAN: >60
GLOBULIN: 2.8 G/DL
GLUCOSE BLD-MCNC: 117 MG/DL (ref 70–99)
HDLC SERPL-MCNC: 42 MG/DL (ref 40–60)
LDL CHOLESTEROL CALCULATED: 124 MG/DL
POTASSIUM SERPL-SCNC: 4.5 MMOL/L (ref 3.5–5.1)
SODIUM BLD-SCNC: 141 MMOL/L (ref 136–145)
TOTAL PROTEIN: 7.1 G/DL (ref 6.4–8.2)
TRIGL SERPL-MCNC: 123 MG/DL (ref 0–150)
VLDLC SERPL CALC-MCNC: 25 MG/DL

## 2021-06-16 LAB
ESTIMATED AVERAGE GLUCOSE: 134.1 MG/DL
HBA1C MFR BLD: 6.3 %

## 2021-07-26 RX ORDER — ATORVASTATIN CALCIUM 80 MG/1
TABLET, FILM COATED ORAL
Qty: 90 TABLET | Refills: 1 | Status: SHIPPED | OUTPATIENT
Start: 2021-07-26 | End: 2022-01-19

## 2024-01-08 ENCOUNTER — OFFICE VISIT (OUTPATIENT)
Age: 84
End: 2024-01-08

## 2024-01-08 VITALS
DIASTOLIC BLOOD PRESSURE: 72 MMHG | TEMPERATURE: 99.1 F | WEIGHT: 159.8 LBS | SYSTOLIC BLOOD PRESSURE: 164 MMHG | HEART RATE: 61 BPM | OXYGEN SATURATION: 94 % | RESPIRATION RATE: 18 BRPM | BODY MASS INDEX: 27.42 KG/M2

## 2024-01-08 DIAGNOSIS — R11.2 NAUSEA AND VOMITING, UNSPECIFIED VOMITING TYPE: ICD-10-CM

## 2024-01-08 DIAGNOSIS — R42 VERTIGO: Primary | ICD-10-CM

## 2024-01-08 DIAGNOSIS — R05.1 ACUTE COUGH: ICD-10-CM

## 2024-01-08 LAB
INFLUENZA A ANTIBODY: NEGATIVE
INFLUENZA B ANTIBODY: NEGATIVE
Lab: 0
QC PASS/FAIL: 0
RSV ANTIGEN: NEGATIVE
SARS-COV-2 RDRP RESP QL NAA+PROBE: NEGATIVE

## 2024-01-08 RX ORDER — ONDANSETRON 4 MG/1
4 TABLET, ORALLY DISINTEGRATING ORAL ONCE
Status: COMPLETED | OUTPATIENT
Start: 2024-01-08 | End: 2024-01-08

## 2024-01-08 RX ORDER — MECLIZINE HYDROCHLORIDE 25 MG/1
25 TABLET ORAL 3 TIMES DAILY PRN
Qty: 15 TABLET | Refills: 0 | Status: SHIPPED | OUTPATIENT
Start: 2024-01-08

## 2024-01-08 RX ADMIN — ONDANSETRON 4 MG: 4 TABLET, ORALLY DISINTEGRATING ORAL at 11:54

## 2024-01-08 ASSESSMENT — ENCOUNTER SYMPTOMS
RHINORRHEA: 0
SINUS PRESSURE: 1
WHEEZING: 1
ABDOMINAL PAIN: 1
VOMITING: 1
SHORTNESS OF BREATH: 1
DIARRHEA: 1
SINUS PAIN: 1
NAUSEA: 1
SORE THROAT: 0
COUGH: 1

## 2024-01-08 NOTE — PROGRESS NOTES
Site: Right Upper Arm Right Upper Arm   Position: Sitting Sitting   Pulse: 61    Resp: 18    Temp: 99.1 °F (37.3 °C)    TempSrc: Oral    SpO2: 94%    Weight: 72.5 kg (159 lb 12.8 oz)      Review of Systems   Constitutional:  Positive for fatigue and fever (Tmax 102).   HENT:  Positive for sinus pressure and sinus pain. Negative for congestion, ear pain, rhinorrhea and sore throat.    Respiratory:  Positive for cough, shortness of breath and wheezing.    Gastrointestinal:  Positive for abdominal pain, diarrhea, nausea and vomiting.   Musculoskeletal:  Negative for myalgias.   Neurological:  Positive for dizziness and headaches.     Physical Exam  Vitals reviewed.   Constitutional:       General: She is not in acute distress.     Appearance: She is ill-appearing.   HENT:      Head: Normocephalic and atraumatic.      Right Ear: Tympanic membrane and ear canal normal.      Left Ear: Tympanic membrane and ear canal normal.      Nose:      Right Sinus: No maxillary sinus tenderness or frontal sinus tenderness.      Left Sinus: No maxillary sinus tenderness or frontal sinus tenderness.      Mouth/Throat:      Mouth: Mucous membranes are moist.      Pharynx: Oropharynx is clear. No posterior oropharyngeal erythema.   Cardiovascular:      Rate and Rhythm: Normal rate and regular rhythm.      Heart sounds: No murmur heard.  Pulmonary:      Effort: Pulmonary effort is normal. No respiratory distress.      Breath sounds: No wheezing, rhonchi or rales.   Abdominal:      General: Abdomen is flat. There is no distension.      Palpations: Abdomen is soft.      Tenderness: There is generalized abdominal tenderness. There is no guarding.   Musculoskeletal:         General: Normal range of motion.   Lymphadenopathy:      Cervical: No cervical adenopathy.   Skin:     General: Skin is warm and dry.      Coloration: Skin is pale.   Neurological:      Mental Status: She is alert.   Psychiatric:         Behavior: Behavior normal.     An

## 2024-01-08 NOTE — PATIENT INSTRUCTIONS
New Prescriptions    MECLIZINE (ANTIVERT) 25 MG TABLET    Take 1 tablet by mouth 3 times daily as needed for Dizziness or Nausea     Thank you for allowing us to care for you today and we hope you feel better soon. Push fluids (Gatorade, propel, Pedialyte).

## 2024-10-31 ENCOUNTER — HOSPITAL ENCOUNTER (OUTPATIENT)
Age: 84
Discharge: HOME | End: 2024-11-02
Attending: STUDENT IN AN ORGANIZED HEALTH CARE EDUCATION/TRAINING PROGRAM
Payer: MEDICARE

## 2024-10-31 VITALS
SYSTOLIC BLOOD PRESSURE: 161 MMHG | HEIGHT: 64 IN | BODY MASS INDEX: 28.17 KG/M2 | WEIGHT: 165 LBS | DIASTOLIC BLOOD PRESSURE: 58 MMHG

## 2024-10-31 DIAGNOSIS — R55 SYNCOPE, UNSPECIFIED SYNCOPE TYPE: ICD-10-CM

## 2024-10-31 LAB
ECHO AO ROOT DIAM: 2.7 CM
ECHO AO ROOT INDEX: 1.5 CM/M2
ECHO AV PEAK GRADIENT: 12 MMHG
ECHO AV PEAK VELOCITY: 1.7 M/S
ECHO AV VELOCITY RATIO: 0.76
ECHO BSA: 1.84 M2
ECHO LA AREA 2C: 17 CM2
ECHO LA AREA 4C: 19.1 CM2
ECHO LA DIAMETER INDEX: 1.94 CM/M2
ECHO LA DIAMETER: 3.5 CM
ECHO LA MAJOR AXIS: 5.4 CM
ECHO LA MINOR AXIS: 5.6 CM
ECHO LA TO AORTIC ROOT RATIO: 1.3
ECHO LA VOL BP: 47 ML (ref 22–52)
ECHO LA VOL MOD A2C: 43 ML (ref 22–52)
ECHO LA VOL MOD A4C: 50 ML (ref 22–52)
ECHO LA VOL/BSA BIPLANE: 26 ML/M2 (ref 16–34)
ECHO LA VOLUME INDEX MOD A2C: 24 ML/M2 (ref 16–34)
ECHO LA VOLUME INDEX MOD A4C: 28 ML/M2 (ref 16–34)
ECHO LV E' LATERAL VELOCITY: 9.2 CM/S
ECHO LV E' SEPTAL VELOCITY: 10.2 CM/S
ECHO LV EF PHYSICIAN: 58 %
ECHO LV FRACTIONAL SHORTENING: 38 % (ref 28–44)
ECHO LV INTERNAL DIMENSION DIASTOLE INDEX: 2.61 CM/M2
ECHO LV INTERNAL DIMENSION DIASTOLIC: 4.7 CM (ref 3.9–5.3)
ECHO LV INTERNAL DIMENSION SYSTOLIC INDEX: 1.61 CM/M2
ECHO LV INTERNAL DIMENSION SYSTOLIC: 2.9 CM
ECHO LV IVSD: 1 CM (ref 0.6–0.9)
ECHO LV MASS 2D: 164.5 G (ref 67–162)
ECHO LV MASS INDEX 2D: 91.4 G/M2 (ref 43–95)
ECHO LV POSTERIOR WALL DIASTOLIC: 1 CM (ref 0.6–0.9)
ECHO LV RELATIVE WALL THICKNESS RATIO: 0.43
ECHO LVOT PEAK GRADIENT: 7 MMHG
ECHO LVOT PEAK VELOCITY: 1.3 M/S
ECHO MV A VELOCITY: 0.76 M/S
ECHO MV E DECELERATION TIME (DT): 139 MS
ECHO MV E VELOCITY: 0.92 M/S
ECHO MV E/A RATIO: 1.21
ECHO MV E/E' LATERAL: 10
ECHO MV E/E' RATIO (AVERAGED): 9.51
ECHO MV E/E' SEPTAL: 9.02
ECHO PULMONARY ARTERY END DIASTOLIC PRESSURE: 5 MMHG
ECHO PV MAX VELOCITY: 1.2 M/S
ECHO PV PEAK GRADIENT: 6 MMHG
ECHO PV REGURGITANT MAX VELOCITY: 1.2 M/S
ECHO RA AREA 4C: 15.5 CM2
ECHO RA END SYSTOLIC VOLUME APICAL 4 CHAMBER INDEX BSA: 22 ML/M2
ECHO RA VOLUME: 40 ML
ECHO RV FREE WALL PEAK S': 13.3 CM/S
ECHO RV INTERNAL DIMENSION: 3.1 CM
ECHO RV TAPSE: 2.4 CM (ref 1.7–?)
ECHO TV REGURGITANT MAX VELOCITY: 3.34 M/S
ECHO TV REGURGITANT PEAK GRADIENT: 45 MMHG

## 2024-10-31 PROCEDURE — 93306 TTE W/DOPPLER COMPLETE: CPT

## 2025-01-30 ENCOUNTER — HOSPITAL ENCOUNTER (OUTPATIENT)
Dept: MRI IMAGING | Age: 85
Discharge: HOME | End: 2025-01-30
Attending: STUDENT IN AN ORGANIZED HEALTH CARE EDUCATION/TRAINING PROGRAM
Payer: MEDICARE

## 2025-01-30 DIAGNOSIS — M54.12 CERVICAL RADICULOPATHY: ICD-10-CM

## 2025-01-30 PROCEDURE — 72141 MRI NECK SPINE W/O DYE: CPT
